# Patient Record
Sex: FEMALE | Race: WHITE | Employment: OTHER | URBAN - METROPOLITAN AREA
[De-identification: names, ages, dates, MRNs, and addresses within clinical notes are randomized per-mention and may not be internally consistent; named-entity substitution may affect disease eponyms.]

---

## 2017-02-06 ENCOUNTER — OFFICE VISIT (OUTPATIENT)
Dept: INTERNAL MEDICINE CLINIC | Age: 82
End: 2017-02-06

## 2017-02-06 VITALS
DIASTOLIC BLOOD PRESSURE: 60 MMHG | HEIGHT: 63 IN | HEART RATE: 74 BPM | WEIGHT: 176 LBS | SYSTOLIC BLOOD PRESSURE: 142 MMHG | BODY MASS INDEX: 31.18 KG/M2 | RESPIRATION RATE: 16 BRPM

## 2017-02-06 DIAGNOSIS — I15.2 OBESITY, DIABETES, AND HYPERTENSION SYNDROME (HCC): Primary | ICD-10-CM

## 2017-02-06 DIAGNOSIS — N95.1 POST MENOPAUSAL SYNDROME: ICD-10-CM

## 2017-02-06 DIAGNOSIS — E66.9 OBESITY, DIABETES, AND HYPERTENSION SYNDROME (HCC): Primary | ICD-10-CM

## 2017-02-06 DIAGNOSIS — S62.102D WRIST FRACTURE, CLOSED, LEFT, WITH ROUTINE HEALING, SUBSEQUENT ENCOUNTER: ICD-10-CM

## 2017-02-06 DIAGNOSIS — E11.69 OBESITY, DIABETES, AND HYPERTENSION SYNDROME (HCC): Primary | ICD-10-CM

## 2017-02-06 DIAGNOSIS — E11.59 OBESITY, DIABETES, AND HYPERTENSION SYNDROME (HCC): Primary | ICD-10-CM

## 2017-02-06 DIAGNOSIS — I10 ESSENTIAL HYPERTENSION: ICD-10-CM

## 2017-02-06 PROBLEM — Z71.89 ADVANCED DIRECTIVES, COUNSELING/DISCUSSION: Status: ACTIVE | Noted: 2017-02-06

## 2017-02-06 RX ORDER — METFORMIN HYDROCHLORIDE 1000 MG/1
1000 TABLET ORAL 2 TIMES DAILY WITH MEALS
Qty: 180 TAB | Refills: 1 | Status: SHIPPED | OUTPATIENT
Start: 2017-02-06 | End: 2017-07-31 | Stop reason: SDUPTHER

## 2017-02-06 NOTE — MR AVS SNAPSHOT
Visit Information Date & Time Provider Department Dept. Phone Encounter #  
 2/6/2017  9:45 AM Yassine Prescott MD [facility] [phone] [id number] Upcoming Health Maintenance Date Due DTaP/Tdap/Td series (1 - Tdap) 8/3/1956 EYE EXAM RETINAL OR DILATED Q1 4/7/2015 MEDICARE YEARLY EXAM 6/17/2017 HEMOGLOBIN A1C Q6M 6/27/2017 MICROALBUMIN Q1 12/27/2017 LIPID PANEL Q1 12/27/2017 FOOT EXAM Q1 2/6/2018 GLAUCOMA SCREENING Q2Y 10/1/2018 Allergies as of 2/6/2017  Review Complete On: 12/15/2016 By: Josi Bright PA-C No Known Allergies Current Immunizations  Reviewed on 10/18/2016 Name Date Influenza High Dose Vaccine PF 10/18/2016 Influenza Vaccine 10/1/2014, 10/1/2013 Influenza Vaccine Split 9/28/2011 Influenza Vaccine Whole 9/23/2009 Pneumococcal Conjugate (PCV-13) 10/18/2016 Pneumococcal Vaccine (Unspecified Type) 3/23/2011, 1/2/2003 Zoster 9/23/2009 Not reviewed this visit Vitals BP Pulse Resp Height(growth percentile) Weight(growth percentile) BMI  
 142/60 74 16 5' 3\" (1.6 m) 176 lb (79.8 kg) 31.18 kg/m2 OB Status Smoking Status Postmenopausal Former Smoker Vitals History BMI and BSA Data Body Mass Index Body Surface Area  
 31.18 kg/m 2 1.88 m 2 Preferred Pharmacy Pharmacy Name Phone [facility] [address] [place] [address] [phone] Your Updated Medication List  
  
   
This list is accurate as of: 2/6/17 10:18 AM.  Always use your most recent med list.  
  
  
  
  
 aspirin 81 mg chewable tablet Take 81 mg by mouth daily. azelastine 137 mcg (0.1 %) nasal spray Commonly known as:  ASTELIN  
1 Spray by Both Nostrils route two (2) times a day. Use in each nostril as directed CALCIUM PO Take  by mouth daily. chlorthalidone 25 mg tablet Commonly known as:  Bren Alford  
 Take 1 Tab by mouth daily. esomeprazole 20 mg capsule Commonly known as:  NexIUM Take 1 Cap by mouth daily. gabapentin 100 mg capsule Commonly known as:  NEURONTIN Take 2 Caps by mouth two (2) times a day. glucose blood VI test strips strip Commonly known as:  blood glucose test  
Check once daily **DX:E08.9**  
  
 ibuprofen 600 mg tablet Commonly known as:  MOTRIN Lancets Misc Use once daily **DX:E08.9**  
  
 * losartan 100 mg tablet Commonly known as:  COZAAR  
TAKE ONE TABLET BY MOUTH ONCE DAILY *NEW DOSE* * losartan 100 mg tablet Commonly known as:  COZAAR  
TAKE ONE TABLET BY MOUTH EVERY DAY  
  
 metFORMIN 1,000 mg tablet Commonly known as:  GLUCOPHAGE Take 1 Tab by mouth two (2) times daily (with meals). MULTIVITAMIN PO Take  by mouth daily. simvastatin 20 mg tablet Commonly known as:  ZOCOR  
TAKE ONE TABLET BY MOUTH AT NIGHT  
  
 triamcinolone acetonide 0.1 % topical cream  
Commonly known as:  KENALOG Apply  to affected area two (2) times a day. use thin layer * Notice: This list has 2 medication(s) that are the same as other medications prescribed for you. Read the directions carefully, and ask your doctor or other care provider to review them with you. Prescriptions Sent to Pharmacy Refills  
 metFORMIN (GLUCOPHAGE) 1,000 mg tablet 1 Sig: Take 1 Tab by mouth two (2) times daily (with meals). Class: Normal  
 Pharmacy: [facility], [address] [facility] Ph #: [phone] Route: Oral  
  
Introducing [facility]! Dear Tereza Dhaliwal: 
Thank you for requesting a [employer] account. Our records indicate that you already have an active [employer] account. You can access your account anytime at [email or web address] Did you know that you can access your hospital and ER discharge instructions at any time in [employer]?   You can also review all of your test results from your hospital stay or ER visit. Additional Information If you have questions, please visit the Frequently Asked Questions section of the [employer] website at [email or web address] Remember, [employer] is NOT to be used for urgent needs. For medical emergencies, dial 911. Now available from your iPhone and Android! Please provide this summary of care documentation to your next provider. Your primary care clinician is listed as Mario Fisher. If you have any questions after today's visit, please call [phone].

## 2017-02-06 NOTE — PROGRESS NOTES
Chief Complaint   Patient presents with    Follow-up     discuss labs      Diabetic ROS - medication compliance: compliant most of the time, diabetic diet compliance: compliant all of the time, home glucose monitoring: is performed regularly, values are usually normal, further diabetic ROS: no polyuria or polydipsia, no chest pain, dyspnea or TIA's, no numbness, tingling or pain in extremities, no hypoglycemia, no medication side effects noted, acute symptoms are joint issues  Wrist neck knee  Had left wrist fracture    . New concerns: .home glucose higher  Than before    Diabetic exam: heart sounds normal rate, regular rhythm, normal S1, S2, no murmurs, rubs, clicks or gallops, chest clear. Lab review: orders written for new lab studies as appropriate; see orders. Assessment: Diabetes Mellitus: stable. Plan: See orders for this visit as documented in the electronic medical record. Diabetic issues reviewed with her: diabetic diet discussed in detail, written exchange diet given, low cholesterol diet, weight control and daily exercise discussed and home glucose monitoring emphasized. Santhosh Aden was seen today for follow-up. Diagnoses and all orders for this visit:    Obesity, diabetes, and hypertension syndrome ([address]    Essential hypertension    Wrist fracture, closed, left, with routine healing, subsequent encounter  -     DEXA BONE DENSITY STUDY AXIAL; Future    Post menopausal syndrome  -     DEXA BONE DENSITY STUDY AXIAL; Future    Other orders  -     metFORMIN (GLUCOPHAGE) 1,000 mg tablet; Take 1 Tab by mouth two (2) times daily (with meals).       Increase metformin

## 2017-02-21 ENCOUNTER — HOSPITAL ENCOUNTER (OUTPATIENT)
Dept: MAMMOGRAPHY | Age: 82
Discharge: HOME | End: 2017-02-21
Attending: INTERNAL MEDICINE
Payer: MEDICARE

## 2017-02-21 DIAGNOSIS — N95.1 POST MENOPAUSAL SYNDROME: ICD-10-CM

## 2017-02-21 DIAGNOSIS — S62.102D WRIST FRACTURE, CLOSED, LEFT, WITH ROUTINE HEALING, SUBSEQUENT ENCOUNTER: ICD-10-CM

## 2017-02-21 PROCEDURE — 77080 DXA BONE DENSITY AXIAL: CPT

## 2017-03-09 RX ORDER — LOSARTAN POTASSIUM 100 MG/1
TABLET ORAL
Qty: 90 TAB | Refills: 1 | Status: SHIPPED | OUTPATIENT
Start: 2017-03-09 | End: 2017-09-11 | Stop reason: SDUPTHER

## 2017-05-17 DIAGNOSIS — E66.9 OBESITY, DIABETES, AND HYPERTENSION SYNDROME (HCC): Primary | ICD-10-CM

## 2017-05-17 DIAGNOSIS — I10 ESSENTIAL HYPERTENSION: ICD-10-CM

## 2017-05-17 DIAGNOSIS — E11.59 OBESITY, DIABETES, AND HYPERTENSION SYNDROME (HCC): Primary | ICD-10-CM

## 2017-05-17 DIAGNOSIS — E11.69 OBESITY, DIABETES, AND HYPERTENSION SYNDROME (HCC): Primary | ICD-10-CM

## 2017-05-17 DIAGNOSIS — I15.2 OBESITY, DIABETES, AND HYPERTENSION SYNDROME (HCC): Primary | ICD-10-CM

## 2017-05-30 RX ORDER — CHLORTHALIDONE 25 MG/1
25 TABLET ORAL DAILY
Qty: 90 TAB | Refills: 1 | Status: SHIPPED | OUTPATIENT
Start: 2017-05-30 | End: 2017-12-12 | Stop reason: SDUPTHER

## 2017-05-30 NOTE — TELEPHONE ENCOUNTER
From: Drake Gong  To: Abdifatah Christopher MD  Sent: 5/27/2017 9:05 AM EDT  Subject: Medication Renewal Request    Original authorizing provider: MD Drake Vargas would like a refill of the following medications:  chlorthalidone (HYGROTEN) 25 mg tablet Abdifatah Christopher MD]    Preferred pharmacy: [place] [address]:  What is DTaP/Tdap/Td?

## 2017-06-02 RX ORDER — CHLORTHALIDONE 25 MG/1
25 TABLET ORAL DAILY
Qty: 90 TAB | Refills: 1 | Status: SHIPPED | OUTPATIENT
Start: 2017-06-02 | End: 2017-11-22 | Stop reason: SDUPTHER

## 2017-06-06 ENCOUNTER — HOSPITAL ENCOUNTER (OUTPATIENT)
Dept: LAB | Age: 82
Discharge: HOME | End: 2017-06-06
Payer: MEDICARE

## 2017-06-06 PROCEDURE — 80061 LIPID PANEL: CPT

## 2017-06-06 PROCEDURE — 36415 COLL VENOUS BLD VENIPUNCTURE: CPT

## 2017-06-06 PROCEDURE — 82043 UR ALBUMIN QUANTITATIVE: CPT

## 2017-06-06 PROCEDURE — 80053 COMPREHEN METABOLIC PANEL: CPT

## 2017-06-06 PROCEDURE — 83036 HEMOGLOBIN GLYCOSYLATED A1C: CPT

## 2017-06-13 ENCOUNTER — OFFICE VISIT (OUTPATIENT)
Dept: INTERNAL MEDICINE CLINIC | Age: 82
End: 2017-06-13

## 2017-06-13 VITALS
OXYGEN SATURATION: 98 % | HEART RATE: 86 BPM | BODY MASS INDEX: 30.65 KG/M2 | TEMPERATURE: 99.1 F | RESPIRATION RATE: 14 BRPM | SYSTOLIC BLOOD PRESSURE: 135 MMHG | DIASTOLIC BLOOD PRESSURE: 56 MMHG | HEIGHT: 63 IN | WEIGHT: 173 LBS

## 2017-06-13 DIAGNOSIS — E11.69 OBESITY, DIABETES, AND HYPERTENSION SYNDROME (HCC): ICD-10-CM

## 2017-06-13 DIAGNOSIS — E66.9 OBESITY, DIABETES, AND HYPERTENSION SYNDROME (HCC): ICD-10-CM

## 2017-06-13 DIAGNOSIS — E11.59 OBESITY, DIABETES, AND HYPERTENSION SYNDROME (HCC): ICD-10-CM

## 2017-06-13 DIAGNOSIS — I15.2 OBESITY, DIABETES, AND HYPERTENSION SYNDROME (HCC): ICD-10-CM

## 2017-06-13 DIAGNOSIS — I10 ESSENTIAL HYPERTENSION: ICD-10-CM

## 2017-06-13 DIAGNOSIS — M70.62 TROCHANTERIC BURSITIS OF LEFT HIP: Primary | ICD-10-CM

## 2017-06-13 NOTE — PROGRESS NOTES
Chief Complaint   Patient presents with    Hip Pain    Ankle swelling    Gas     Patient Active Problem List    Diagnosis    Advanced directives, counseling/discussion    Acute medial meniscus tear of right knee    Post-operative state    Status post cervical spinal fusion    Obesity, diabetes, and hypertension syndrome (HCC)    Osteopenia    Diabetes ([facility] [address]    HLD (hyperlipidemia)    HTN (hypertension)    PSVT (paroxysmal supraventricular tachycardia) ([facility] [address]     Original episode 2007  Repeat episode 2016       Chief Complaint   Patient presents with    Hip Pain    Ankle swelling    Gas     Diabetic ROS - medication compliance: compliant most of the time, diabetic diet compliance: compliant all of the time, home glucose monitoring: is performed regularly, values are usually normal, further diabetic ROS: no polyuria or polydipsia, no chest pain, dyspnea or TIA's, no numbness, tingling or pain in extremities, no hypoglycemia, no medication side effects noted, acute symptoms are joint issues  Wrist neck knee  Had left wrist fracture  Glucose 120 to 100  . New concerns  Left buttock left hip pain  Diabetic exam: heart sounds normal rate, regular rhythm, normal S1, S2, no murmurs, rubs, clicks or gallops, chest clear. A left hip exam was performed. GENERAL: no acute distress  SWELLING: none  WARMTH: no warmth  TENDERNESS: mild and maximal at greater trochanter  ROM: full  STRENGTH: normal and equal bilaterally  GAIT: antalgic    Lab review: orders written for new lab studies as appropriate; see orders. Assessment: Diabetes Mellitus: stable. Plan: See orders for this visit as documented in the electronic medical record. Diabetic issues reviewed with her: diabetic diet discussed in detail, written exchange diet given, low cholesterol diet, weight control and daily exercise discussed and home glucose monitoring emphasized. There are no diagnoses linked to this encounter.       Lily was seen today for hip pain, ankle swelling and gas. Diagnoses and all orders for this visit:    Trochanteric bursitis of left hip    Essential hypertension    Obesity, diabetes, and hypertension syndrome ([address]    Other orders  -     diph,Pertuss,Acell,,Tet Vac-PF (ADACEL) 2 Lf-(2.5-5-3-5 mcg)-5Lf/0.5 mL susp; 0.5 mL by IntraMUSCular route once for 1 dose.       Try exercises

## 2017-06-13 NOTE — MR AVS SNAPSHOT
Visit Information Date & Time Provider Department Dept. Phone Encounter #  
 6/13/2017  1:15 PM Anabel Litten, [address] [facility] [phone] [id number] Your Appointments 7/7/2017 11:15 AM  
Medicare Physical with Anabel Litten, MD  
[facility] Appt Note: medicare wellness [place] [address] [facility] [address]  
[facility] [address] [facility] [address] [address] Upcoming Health Maintenance Date Due DTaP/Tdap/Td series (1 - Tdap) 8[birth year]3/1956 MEDICARE YEARLY EXAM 6/17/2017 INFLUENZA AGE 9 TO ADULT 8/1/2017 HEMOGLOBIN A1C Q6M 12/6/2017 FOOT EXAM Q1 2/6/2018 EYE EXAM RETINAL OR DILATED Q1 4/18/2018 MICROALBUMIN Q1 6/6/2018 LIPID PANEL Q1 6/6/2018 GLAUCOMA SCREENING Q2Y 4/18/2019 Allergies as of 6/13/2017  Review Complete On: 6/13/2017 By: Della Koehler LPN No Known Allergies Current Immunizations  Reviewed on 10/18/2016 Name Date Influenza High Dose Vaccine PF 10/18/2016 Influenza Vaccine 10/1/2014, 10/1/2013 Influenza Vaccine Split 9/28/2011 Influenza Vaccine Whole 9/23/2009 Pneumococcal Conjugate (PCV-13) 10/18/2016 Pneumococcal Vaccine (Unspecified Type) 3/23/2011, 1/2/2003 Zoster 9/23/2009 Not reviewed this visit You Were Diagnosed With   
  
 Codes Comments Essential hypertension    -  Primary ICD-10-CM: I10 
ICD-9-CM: 401.9 Vitals BP Pulse Temp Resp Height(growth percentile) Weight(growth percentile) 135/56 (BP 1 Location: Left arm, BP Patient Position: Sitting) 86 99.1 °F (37.3 °C) (Oral) 14 5' 3\" (1.6 m) 173 lb (78.5 kg) SpO2 BMI OB Status Smoking Status 98% 30.65 kg/m2 Postmenopausal Former Smoker BMI and BSA Data Body Mass Index Body Surface Area  
 30.65 kg/m 2 1.87 m 2 Preferred Pharmacy Pharmacy Name Phone [facility] [address] [place] [address] [phone] Your Updated Medication List  
  
   
This list is accurate as of: 6/13/17  1:50 PM.  Always use your most recent med list.  
  
  
  
  
 aspirin 81 mg chewable tablet Take 81 mg by mouth daily. azelastine 137 mcg (0.1 %) nasal spray Commonly known as:  ASTELIN  
1 Spray by Both Nostrils route two (2) times a day. Use in each nostril as directed CALCIUM PO Take  by mouth daily. * chlorthalidone 25 mg tablet Commonly known as:  Van Shackelford Take 1 Tab by mouth daily. * chlorthalidone 25 mg tablet Commonly known as:  Van Shackelford Take 1 Tab by mouth daily. diph,Pertuss(Acell),Tet Vac-PF 2 Lf-(2.5-5-3-5 mcg)-5Lf/0.5 mL susp Commonly known as:  ADACEL  
0.5 mL by IntraMUSCular route once for 1 dose. esomeprazole 20 mg capsule Commonly known as:  NexIUM Take 1 Cap by mouth daily. gabapentin 100 mg capsule Commonly known as:  NEURONTIN Take 2 Caps by mouth two (2) times a day. glucose blood VI test strips strip Commonly known as:  blood glucose test  
Check once daily **DX:E08.9**  
  
 ibuprofen 600 mg tablet Commonly known as:  MOTRIN Lancets Misc Use once daily **DX:E08.9**  
  
 * losartan 100 mg tablet Commonly known as:  COZAAR  
TAKE ONE TABLET BY MOUTH ONCE DAILY *NEW DOSE* * losartan 100 mg tablet Commonly known as:  COZAAR  
TAKE ONE TABLET BY MOUTH EVERY DAY  
  
 metFORMIN 1,000 mg tablet Commonly known as:  GLUCOPHAGE Take 1 Tab by mouth two (2) times daily (with meals). MULTIVITAMIN PO Take  by mouth daily. simvastatin 20 mg tablet Commonly known as:  ZOCOR  
TAKE ONE TABLET BY MOUTH AT NIGHT  
  
 triamcinolone acetonide 0.1 % topical cream  
Commonly known as:  KENALOG Apply  to affected area two (2) times a day. use thin layer * Notice:   This list has 4 medication(s) that are the same as other medications prescribed for you. Read the directions carefully, and ask your doctor or other care provider to review them with you. Prescriptions Printed Refills diph,Pertuss,Acell,,Tet Vac-PF (ADACEL) 2 Lf-(2.5-5-3-5 mcg)-5Lf/0.5 mL susp 0 Si.5 mL by IntraMUSCular route once for 1 dose. Class: Print Route: IntraMUSCular Introducing [facility] HEALTH SERVICES! Dear Hardik Benitez: 
Thank you for requesting a 2-Observe account. Our records indicate that you already have an active 2-Observe account. You can access your account anytime at [email or web address] Did you know that you can access your hospital and ER discharge instructions at any time in 2-Observe? You can also review all of your test results from your hospital stay or ER visit. Additional Information If you have questions, please visit the Frequently Asked Questions section of the 2-Observe website at [email or web address] Remember, 2-Observe is NOT to be used for urgent needs. For medical emergencies, dial 911. Now available from your iPhone and Android! Please provide this summary of care documentation to your next provider. Your primary care clinician is listed as Gaby Zepeda. If you have any questions after today's visit, please call [phone].

## 2017-06-26 NOTE — TELEPHONE ENCOUNTER
From: Errol García  To: Ealr Hernández MD  Sent: 6/26/2017 8:37 AM EDT  Subject: Medication Renewal Request    Original authorizing provider: Earl Hernández MD    Denver Hoh.  Melissa Jacek would like a refill of the following medications:  simvastatin (ZOCOR) 20 mg tablet Earl Hernández MD]    Preferred pharmacy: [address] [place]:

## 2017-06-27 RX ORDER — SIMVASTATIN 20 MG/1
TABLET, FILM COATED ORAL
Qty: 90 TAB | Refills: 3 | Status: SHIPPED | OUTPATIENT
Start: 2017-06-27 | End: 2017-12-20 | Stop reason: SDUPTHER

## 2017-07-07 ENCOUNTER — OFFICE VISIT (OUTPATIENT)
Dept: INTERNAL MEDICINE CLINIC | Age: 82
End: 2017-07-07

## 2017-07-07 VITALS
BODY MASS INDEX: 30.3 KG/M2 | OXYGEN SATURATION: 98 % | HEART RATE: 78 BPM | WEIGHT: 171 LBS | RESPIRATION RATE: 16 BRPM | DIASTOLIC BLOOD PRESSURE: 54 MMHG | SYSTOLIC BLOOD PRESSURE: 149 MMHG | HEIGHT: 63 IN

## 2017-07-07 DIAGNOSIS — Z13.39 SCREENING FOR ALCOHOLISM: ICD-10-CM

## 2017-07-07 DIAGNOSIS — Z00.00 ROUTINE GENERAL MEDICAL EXAMINATION AT A HEALTH CARE FACILITY: ICD-10-CM

## 2017-07-07 NOTE — PROGRESS NOTES
Chief Complaint   Patient presents with    Annual Wellness Visit     Left buttock pain    This is a Subsequent Medicare Annual Wellness Visit providing Personalized Prevention Plan Services (PPPS) (Performed 12 months after initial AWV and PPPS )    I have reviewed the patient's medical history in detail and updated the computerized patient record. History     Past Medical History:   Diagnosis Date    Arthritis     Diabetes ([facility] [address] 3/20/2010    HLD (hyperlipidemia) 3/20/2010    Hypertension     Osteopenia 10/30/2012    PSVT (paroxysmal supraventricular tachycardia) ([facility] [address] 3/20/2010    Psychiatric disorder     HX OF DEPRESSION-\"LONG TIME AGO\"    Psychiatric disorder     ANXIETY      Past Surgical History:   Procedure Laterality Date    BREAST SURGERY PROCEDURE UNLISTED  1975    cyst-RIGHT    CARDIAC SURG PROCEDURE UNLIST  2006    tachycardia    stress test    ENDOSCOPY, COLON, DIAGNOSTIC  2004    HX ADENOIDECTOMY      HX CATARACT REMOVAL  2013    HX CHOLECYSTECTOMY      HX DILATION AND CURETTAGE  1962    HX ORTHOPAEDIC  1976    L FOOT    HX TONSILLECTOMY      NEUROLOGICAL PROCEDURE UNLISTED  8/14    cervical decompression     Current Outpatient Prescriptions   Medication Sig Dispense Refill    simvastatin (ZOCOR) 20 mg tablet TAKE ONE TABLET BY MOUTH AT NIGHT 90 Tab 3    chlorthalidone (HYGROTEN) 25 mg tablet Take 1 Tab by mouth daily. 90 Tab 1    chlorthalidone (HYGROTEN) 25 mg tablet Take 1 Tab by mouth daily. 90 Tab 1    losartan (COZAAR) 100 mg tablet TAKE ONE TABLET BY MOUTH EVERY DAY 90 Tab 1    metFORMIN (GLUCOPHAGE) 1,000 mg tablet Take 1 Tab by mouth two (2) times daily (with meals).  180 Tab 1    Lancets misc Use once daily **DX:E08.9** 100 Each 5    losartan (COZAAR) 100 mg tablet TAKE ONE TABLET BY MOUTH ONCE DAILY *NEW DOSE* 90 Tab 0    glucose blood VI test strips (BLOOD GLUCOSE TEST) strip Check once daily **DX:E08.9** 100 Strip 2    gabapentin (NEURONTIN) 100 mg capsule Take 2 Caps by mouth two (2) times a day. 90 Cap 5    triamcinolone acetonide (KENALOG) 0.1 % topical cream Apply  to affected area two (2) times a day. use thin layer 30 g 0    ibuprofen (MOTRIN) 600 mg tablet       CALCIUM PO Take  by mouth daily.  MULTIVITAMIN PO Take  by mouth daily.  aspirin 81 mg chewable tablet Take 81 mg by mouth daily. No Known Allergies  Family History   Problem Relation Age of Onset    Heart Disease Father     Liver Disease Father      HEP C    Asthma Mother     Lung Disease Mother     Seizures Sister     Anesth Problems Neg Hx      Social History   Substance Use Topics    Smoking status: Former Smoker     Packs/day: 0.50     Years: 29.00     Quit date: 8/23/1989    Smokeless tobacco: Never Used    Alcohol use Yes      Comment: rarely     Patient Active Problem List   Diagnosis Code    Diabetes ([facility] [address] E11.9    HLD (hyperlipidemia) E78.5    HTN (hypertension) I10    PSVT (paroxysmal supraventricular tachycardia) (HCC) I47.1    Osteopenia M85.80    Obesity, diabetes, and hypertension syndrome (HCC) E11.9, I10, E66.9    Status post cervical spinal fusion Z98.1    Post-operative state Z98.890    Acute medial meniscus tear of right knee S83.241A    Advanced directives, counseling/discussion Z71.89       Depression Risk Factor Screening:     PHQ over the last two weeks 7/7/2017   Little interest or pleasure in doing things Not at all   Feeling down, depressed or hopeless Not at all   Total Score PHQ 2 0     Alcohol Risk Factor Screening: On any occasion during the past 3 months, have you had more than 4 drinks containing alcohol? No    Do you average more than 14 drinks per week? No        Functional Ability and Level of Safety:     Hearing Loss   mild    Activities of Daily Living   Self-care. Requires assistance with: no ADLs    Fall Risk   Fall Risk Assessment, last 12 mths 7/7/2017   Able to walk? Yes   Fall in past 12 months?  No   Fall with injury? -   Number of falls in past 12 months -     Abuse Screen   Patient is not abused    Review of Systems   Pertinent items are noted in HPI. Physical Examination     Evaluation of Cognitive Function:  Mood/affect:  neutral  Appearance: age appropriate  Family member/caregiver input: no    Visit Vitals    /54    Pulse 78    Resp 16    Ht 5' 3\" (1.6 m)    Wt 171 lb (77.6 kg)    SpO2 98%    BMI 30.29 kg/m2     General appearance: alert, cooperative, no distress, appears stated age  Back exam: tenderness noted slight lumbar. Patient Care Team:  Sameera Yoon MD as PCP - General  Wyatt Gonzalez MD (Ophthalmology)    Advice/Referrals/Counseling   Education and counseling provided:  Are appropriate based on today's review and evaluation  End-of-Life planning (with patient's consent)      Assessment/Plan   Lily was seen today for annual wellness visit. Diagnoses and all orders for this visit:    Routine general medical examination at a health care facility    Screening for alcoholism    . Advance Care Planning (ACP) Provider Conversation Snapshot    Date of ACP Conversation: 07/07/17  Persons included in Conversation:  patient  Length of ACP Conversation in minutes:  <16 minutes (Non-Billable)    Authorized Decision Maker (if patient is incapable of making informed decisions): This person is:    Other Legally Authorized Decision Maker (e.g. Next of Kin)          For Patients with Decision Making Capacity:   Values/Goals: Exploration of values, goals, and preferences if recovery is not expected, even with continued medical treatment in the event of:  Imminent death    Conversation Outcomes / Follow-Up Plan:   Recommended completion of Advance Directive form after review of ACP materials and conversation with prospective healthcare agent

## 2017-07-07 NOTE — MR AVS SNAPSHOT
Visit Information Date & Time Provider Department Dept. Phone Encounter #  
 7/7/2017 11:15 AM Karyle Lory, MD Formerly [facility], [facility] [phone] [id number] Upcoming Health Maintenance Date Due  
 MEDICARE YEARLY EXAM 6/17/2017 INFLUENZA AGE 9 TO ADULT 8/1/2017 HEMOGLOBIN A1C Q6M 12/6/2017 FOOT EXAM Q1 2/6/2018 EYE EXAM RETINAL OR DILATED Q1 4/18/2018 MICROALBUMIN Q1 6/6/2018 LIPID PANEL Q1 6/6/2018 GLAUCOMA SCREENING Q2Y 4/18/2019 DTaP/Tdap/Td series (2 - Td) 6/5/2027 Allergies as of 7/7/2017  Review Complete On: 7/7/2017 By: Walker Copeland LPN No Known Allergies Current Immunizations  Reviewed on 10/18/2016 Name Date Influenza High Dose Vaccine PF 10/18/2016 Influenza Vaccine 10/1/2014, 10/1/2013 Influenza Vaccine Split 9/28/2011 Influenza Vaccine Whole 9/23/2009 Pneumococcal Conjugate (PCV-13) 10/18/2016 Pneumococcal Vaccine (Unspecified Type) 3/23/2011, 1/2/2003 Zoster 9/23/2009 Not reviewed this visit You Were Diagnosed With   
  
 Codes Comments Routine general medical examination at a health care facility     ICD-10-CM: Z00.00 ICD-9-CM: V70.0 Screening for alcoholism     ICD-10-CM: Z13.89 ICD-9-CM: V79.1 Vitals BP Pulse Resp Height(growth percentile) Weight(growth percentile) SpO2  
 149/54 78 16 5' 3\" (1.6 m) 171 lb (77.6 kg) 98% BMI OB Status Smoking Status 30.29 kg/m2 Postmenopausal Former Smoker Vitals History BMI and BSA Data Body Mass Index Body Surface Area  
 30.29 kg/m 2 1.86 m 2 Preferred Pharmacy Pharmacy Name Phone [facility] [address] [place] [address] [phone] Your Updated Medication List  
  
   
This list is accurate as of: 7/7/17 11:23 AM.  Always use your most recent med list.  
  
  
  
  
 aspirin 81 mg chewable tablet Take 81 mg by mouth daily.   
  
 CALCIUM PO  
 Take  by mouth daily. * chlorthalidone 25 mg tablet Commonly known as:  Jenene Legacy Take 1 Tab by mouth daily. * chlorthalidone 25 mg tablet Commonly known as:  Jenene Legacy Take 1 Tab by mouth daily. gabapentin 100 mg capsule Commonly known as:  NEURONTIN Take 2 Caps by mouth two (2) times a day. glucose blood VI test strips strip Commonly known as:  blood glucose test  
Check once daily **DX:E08.9**  
  
 ibuprofen 600 mg tablet Commonly known as:  MOTRIN Lancets Misc Use once daily **DX:E08.9**  
  
 * losartan 100 mg tablet Commonly known as:  COZAAR  
TAKE ONE TABLET BY MOUTH ONCE DAILY *NEW DOSE* * losartan 100 mg tablet Commonly known as:  COZAAR  
TAKE ONE TABLET BY MOUTH EVERY DAY  
  
 metFORMIN 1,000 mg tablet Commonly known as:  GLUCOPHAGE Take 1 Tab by mouth two (2) times daily (with meals). MULTIVITAMIN PO Take  by mouth daily. simvastatin 20 mg tablet Commonly known as:  ZOCOR  
TAKE ONE TABLET BY MOUTH AT NIGHT  
  
 triamcinolone acetonide 0.1 % topical cream  
Commonly known as:  KENALOG Apply  to affected area two (2) times a day. use thin layer * Notice: This list has 4 medication(s) that are the same as other medications prescribed for you. Read the directions carefully, and ask your doctor or other care provider to review them with you. Introducing [facility] & HEALTH SERVICES! Dear Luzmaria Landeros: 
Thank you for requesting a [employer] account. Our records indicate that you already have an active [employer] account. You can access your account anytime at [email or web address] Did you know that you can access your hospital and ER discharge instructions at any time in [employer]? You can also review all of your test results from your hospital stay or ER visit. Additional Information If you have questions, please visit the Frequently Asked Questions section of the [employer] website at [email or web address] Remember, [employer] is NOT to be used for urgent needs. For medical emergencies, dial 911. Now available from your iPhone and Android! Please provide this summary of care documentation to your next provider. Your primary care clinician is listed as Nelsy Rouse. If you have any questions after today's visit, please call [phone].

## 2017-07-07 NOTE — PROGRESS NOTES
Coordination of Care Questions    1. Have you been to the ER, urgent care clinic since your last visit? No       Hospitalized since your last visit? No    2. Have you seen or consulted any other health care providers outside of the [address] [place] [address] since your last visit? Include any pap smears or colon screening.  No

## 2017-07-31 RX ORDER — METFORMIN HYDROCHLORIDE 1000 MG/1
1000 TABLET ORAL 2 TIMES DAILY WITH MEALS
Qty: 180 TAB | Refills: 1 | Status: SHIPPED | OUTPATIENT
Start: 2017-07-31 | End: 2018-02-10 | Stop reason: SDUPTHER

## 2017-07-31 NOTE — TELEPHONE ENCOUNTER
From: Rodolfo Gao  To: Verner Landry, MD  Sent: 7/31/2017 8:53 AM EDT  Subject: Medication Renewal Request    Original authorizing provider: Verner Landry, MD Lauree Norton.  Ángel Willis would like a refill of the following medications:  metFORMIN (GLUCOPHAGE) 1,000 mg tablet Verner Landry, MD]    Preferred pharmacy: [address] [place]:

## 2017-08-28 NOTE — TELEPHONE ENCOUNTER
From: Rodolfo Gao  To: Maine Dasilva MD  Sent: 8/27/2017 2:33 PM EDT  Subject: Medication Renewal Request    Original authorizing provider: MD Susanne Holland.  Ángel Willis would like a refill of the following medications:  glucose blood VI test strips (BLOOD GLUCOSE TEST) strip Maine Dasilva MD]    Preferred pharmacy: [place] [address], [address] [facility] 1205:

## 2017-09-05 RX ORDER — GABAPENTIN 100 MG/1
200 CAPSULE ORAL 2 TIMES DAILY
Qty: 120 CAP | Refills: 0 | Status: SHIPPED | OUTPATIENT
Start: 2017-09-05 | End: 2017-10-05 | Stop reason: SDUPTHER

## 2017-09-05 NOTE — TELEPHONE ENCOUNTER
From: Dina Mendes  To: Ruby Hay MD  Sent: 9/3/2017 9:52 AM EDT  Subject: Medication Renewal Request    Original authorizing provider: MD Pierce Siu Overall would like a refill of the following medications:  gabapentin (NEURONTIN) 100 mg capsule Ruby Hay MD]    Preferred pharmacy: [address] [place]:

## 2017-09-11 RX ORDER — LOSARTAN POTASSIUM 100 MG/1
TABLET ORAL
Qty: 90 TAB | Refills: 1 | Status: SHIPPED | OUTPATIENT
Start: 2017-09-11 | End: 2018-03-13 | Stop reason: SDUPTHER

## 2017-09-11 NOTE — TELEPHONE ENCOUNTER
From: Garnetta Babinski  To: Richard Damon MD  Sent: 9/11/2017 10:08 AM EDT  Subject: Medication Renewal Request    Original authorizing provider: MD Mecca Phamkassidy NicolasBryce Aparicio would like a refill of the following medications:  losartan (COZAAR) 100 mg tablet Richard Damon MD]    Preferred pharmacy: [address] [place]:

## 2017-10-05 RX ORDER — GABAPENTIN 100 MG/1
200 CAPSULE ORAL 2 TIMES DAILY
Qty: 120 CAP | Refills: 5 | Status: SHIPPED | OUTPATIENT
Start: 2017-10-05 | End: 2017-11-03 | Stop reason: SDUPTHER

## 2017-10-05 NOTE — TELEPHONE ENCOUNTER
From: Therese Gu  To: Carlos Shepherd MD  Sent: 10/5/2017 11:49 AM EDT  Subject: Medication Renewal Request    Original authorizing provider: MD Nora Gurrola.  Vicente Madison would like a refill of the following medications:  gabapentin (NEURONTIN) 100 mg capsule Carlos Shepherd MD]    Preferred pharmacy: [place] [address], [address] [facility] 1205:

## 2017-11-03 NOTE — TELEPHONE ENCOUNTER
From: Ursula Trujillo  To: Anu Damon MD  Sent: 11/3/2017 10:42 AM EDT  Subject: Medication Renewal Request    Original authorizing provider: MD Jeanna Ferguson.  [facility] would like a refill of the following medications:  gabapentin (NEURONTIN) 100 mg capsule Anu Damon MD]    Preferred pharmacy: [address] [place]:

## 2017-11-05 RX ORDER — GABAPENTIN 100 MG/1
200 CAPSULE ORAL 2 TIMES DAILY
Qty: 120 CAP | Refills: 5 | Status: SHIPPED | OUTPATIENT
Start: 2017-11-05 | End: 2018-04-07 | Stop reason: SDUPTHER

## 2017-11-22 RX ORDER — CHLORTHALIDONE 25 MG/1
25 TABLET ORAL DAILY
Qty: 90 TAB | Refills: 1 | Status: SHIPPED | OUTPATIENT
Start: 2017-11-22 | End: 2018-05-22 | Stop reason: SDUPTHER

## 2017-11-24 DIAGNOSIS — E66.9 OBESITY, DIABETES, AND HYPERTENSION SYNDROME (HCC): Primary | ICD-10-CM

## 2017-11-24 DIAGNOSIS — I10 ESSENTIAL HYPERTENSION: ICD-10-CM

## 2017-11-24 DIAGNOSIS — I15.2 OBESITY, DIABETES, AND HYPERTENSION SYNDROME (HCC): Primary | ICD-10-CM

## 2017-11-24 DIAGNOSIS — E11.69 OBESITY, DIABETES, AND HYPERTENSION SYNDROME (HCC): Primary | ICD-10-CM

## 2017-11-24 DIAGNOSIS — E11.59 OBESITY, DIABETES, AND HYPERTENSION SYNDROME (HCC): Primary | ICD-10-CM

## 2017-12-07 LAB
INR, EXTERNAL: 1
PT, EXTERNAL: 11.3

## 2017-12-08 LAB
CREATININE, EXTERNAL: 1.26
HBA1C MFR BLD HPLC: 6.5 %
INR, EXTERNAL: 1.1
LDL-C, EXTERNAL: 42
PT, EXTERNAL: 12.2

## 2017-12-12 ENCOUNTER — PATIENT OUTREACH (OUTPATIENT)
Dept: INTERNAL MEDICINE CLINIC | Age: 82
End: 2017-12-12

## 2017-12-12 RX ORDER — CEPHALEXIN 500 MG/1
500 CAPSULE ORAL 2 TIMES DAILY
COMMUNITY
Start: 2017-12-13 | End: 2017-12-13

## 2017-12-12 NOTE — PROGRESS NOTES
Patient was admitted to [facility] - for NSTEMI. Presented with c/o palpitations, dizziness, low BP. Hospital Course:    Palpitations/Dizziness: head CT, brain MRI, head/neck MRA unremarkable, most likely cardiac related    NSTEMI: troponin peaked at 1.1, initially placed on heparin drip echo with EF 55-60%, cardiac cath  with nonobstructive CAD, cont ASA/statin, no BB added per cardiology    UTI: urine cx positive for E coli, transition from Rocephin to PO Keflex    PLAN: Discharged home. Instructed to f/u with cardiology Dr. Terri Reyes in 2 wks, PCP in one week. No medication changes. : Contacted patient for WILLIAM follow up. Introduced self and Nurse Navigator role. Verified patient's . Reports she is doing well, \"taking it easy\", which is difficult for her because she is typically very active. Her  is home with her and encourages her to rest. Cardiac cath site R wrist, pt to remove dressing this morning, wash site, and cover with a Band-aid, per pt. Denies any pain, swelling, redness at site. Reports minimal bruising. Reviewed red flags and when to call cardiology. Patient typically checks blood sugar daily, typically ranges . She has not checked yet today. She is aware that HgbA1C is at goal at 6.5. Exercises 4-5 days per week-water aerobics 2 days/wk & walks at the mall 2-3 days/wk, stays active with house work. Denies any s/s UTI prior to admission or now. Will finish Keflex today. Reviewed all medications with patient reading from a current list and updated Med Rec. No medication changes at discharge. Reviewed plan to attend appointment with Dr. Mildred Carreon scheduled next  at 2:15 pm. Appointment scheduled with cardiology Dr. Terri Reyes 18. The patient denies any questions or concerns.

## 2017-12-13 RX ORDER — CHROMIUM PICOLINATE 200 MCG
2 TABLET ORAL DAILY
COMMUNITY
End: 2019-09-11 | Stop reason: SDUPTHER

## 2017-12-20 ENCOUNTER — OFFICE VISIT (OUTPATIENT)
Dept: INTERNAL MEDICINE CLINIC | Age: 82
End: 2017-12-20

## 2017-12-20 VITALS
HEART RATE: 76 BPM | DIASTOLIC BLOOD PRESSURE: 50 MMHG | RESPIRATION RATE: 16 BRPM | OXYGEN SATURATION: 98 % | WEIGHT: 172 LBS | HEIGHT: 63 IN | SYSTOLIC BLOOD PRESSURE: 134 MMHG | BODY MASS INDEX: 30.48 KG/M2

## 2017-12-20 DIAGNOSIS — I25.119 CORONARY ARTERY DISEASE INVOLVING NATIVE CORONARY ARTERY OF NATIVE HEART WITH ANGINA PECTORIS (HCC): ICD-10-CM

## 2017-12-20 PROBLEM — E11.21 TYPE 2 DIABETES MELLITUS WITH NEPHROPATHY (HCC): Status: ACTIVE | Noted: 2017-12-20

## 2017-12-20 RX ORDER — SIMVASTATIN 40 MG/1
40 TABLET, FILM COATED ORAL
Qty: 90 TAB | Refills: 3 | Status: SHIPPED | OUTPATIENT
Start: 2017-12-20 | End: 2018-12-12 | Stop reason: SDUPTHER

## 2017-12-20 NOTE — MR AVS SNAPSHOT
Visit Information Date & Time Provider Department Dept. Phone Encounter #  
 12/20/2017  2:15 PM Brooke Myers, [address] [facility] [phone] [id number] Follow-up Instructions Return in about 3 months (around 3/20/2018). Follow-up and Disposition History Upcoming Health Maintenance Date Due HEMOGLOBIN A1C Q6M 12/6/2017 FOOT EXAM Q1 2/6/2018 EYE EXAM RETINAL OR DILATED Q1 4/18/2018 MICROALBUMIN Q1 6/6/2018 LIPID PANEL Q1 6/6/2018 MEDICARE YEARLY EXAM 7/8/2018 GLAUCOMA SCREENING Q2Y 4/18/2019 DTaP/Tdap/Td series (2 - Td) 6/14/2027 Allergies as of 12/20/2017  Review Complete On: 7/7/2017 By: Miguelina Mckinley LPN No Known Allergies Current Immunizations  Reviewed on 10/5/2017 Name Date Influenza High Dose Vaccine PF 10/4/2017, 10/18/2016 Influenza Vaccine 10/1/2014, 10/1/2013 Influenza Vaccine Split 9/28/2011 Influenza Vaccine Whole 9/23/2009 Pneumococcal Conjugate (PCV-13) 10/18/2016 Tdap 6/14/2017 ZZZ-RETIRED (DO NOT USE) Pneumococcal Vaccine (Unspecified Type) 3/23/2011, 1/2/2003 Zoster 9/23/2009 Not reviewed this visit You Were Diagnosed With   
  
 Codes Comments Coronary artery disease involving native coronary artery of native heart with angina pectoris ([facility] [address]     ICD-10-CM: I25.119 ICD-9-CM: 414.01, 413.9 Vitals BP Pulse Resp Height(growth percentile) Weight(growth percentile) SpO2  
 134/50 76 16 5' 3\" (1.6 m) 172 lb (78 kg) 98% BMI OB Status Smoking Status 30.47 kg/m2 Postmenopausal Former Smoker Vitals History BMI and BSA Data Body Mass Index Body Surface Area  
 30.47 kg/m 2 1.86 m 2 Preferred Pharmacy Pharmacy Name Phone [place] [facility] [address] [facility] [phone] Your Updated Medication List  
  
   
This list is accurate as of: 12/20/17  2:40 PM.  Always use your most recent med list.  
  
  
  
  
 aspirin 81 mg chewable tablet Take 81 mg by mouth daily. Calcium-Cholecalciferol (D3) 600 mg(1,500mg) -400 unit Cap Take 2 Tabs by mouth daily. chlorthalidone 25 mg tablet Commonly known as:  Kisha Citizen Take 1 Tab by mouth daily. gabapentin 100 mg capsule Commonly known as:  NEURONTIN Take 2 Caps by mouth two (2) times a day. glucose blood VI test strips strip Commonly known as:  blood glucose test  
Check once daily **DX:E08.9** Lancets Misc Use once daily **DX:E08.9**  
  
 losartan 100 mg tablet Commonly known as:  COZAAR  
TAKE ONE TABLET BY MOUTH EVERY DAY  
  
 metFORMIN 1,000 mg tablet Commonly known as:  GLUCOPHAGE Take 1 Tab by mouth two (2) times daily (with meals). MULTIVITAMIN PO Take 1 Tab by mouth daily. simvastatin 40 mg tablet Commonly known as:  ZOCOR Take 1 Tab by mouth nightly. TAKE ONE TABLET BY MOUTH AT NIGHT Prescriptions Printed Refills  
 simvastatin (ZOCOR) 40 mg tablet 3 Sig: Take 1 Tab by mouth nightly. TAKE ONE TABLET BY MOUTH AT NIGHT Class: Print Route: Oral  
  
Follow-up Instructions Return in about 3 months (around 3/20/2018). To-Do List   
 12/28/2017 2:30 PM  
(Arrive by 2:15 PM) Appointment with [facility] 1 at [facility] ([phone]) Shower or bathe using soap and water. Do not use deodorant, powder, perfumes, or lotion the day of your exam.  If your prior mammograms were not performed at [facility] please bring films with you or forward prior images 2 days before your procedure. Check in at registration 15min before your appointment time unless you were instructed to do otherwise. A script is not necessary, but if you have one, please bring it on the day of the mammogram or have it faxed to the department.   [facility] [phone] [facility]  [phone] [facility] [phone] [facility]  [phone] [facility] [phone] [facility] [phone] [facility] Please arrive 15 minutes prior to appointment to register Introducing [facility] & [facility] SERVICES! Dear Sathya Wells: 
Thank you for requesting a [employer] account. Our records indicate that you already have an active [employer] account. You can access your account anytime at [email or web address] Did you know that you can access your hospital and ER discharge instructions at any time in [employer]? You can also review all of your test results from your hospital stay or ER visit. Additional Information If you have questions, please visit the Frequently Asked Questions section of the [employer] website at [email or web address] Remember, [employer] is NOT to be used for urgent needs. For medical emergencies, dial 911. Now available from your iPhone and Android! Please provide this summary of care documentation to your next provider. Your primary care clinician is listed as Pearl Aldana. If you have any questions after today's visit, please call [phone].

## 2017-12-20 NOTE — PROGRESS NOTES
Chief Complaint   Patient presents with   [facility] Follow Up     patient was seen in [facility] on 12/14      [facility] 12/7-12/12 for NSTEMI. Presented with c/o palpitations, dizziness, low BP.     Hospital Course:     Palpitations/Dizziness: head CT, brain MRI, head/neck MRA unremarkable, most likely cardiac related     NSTEMI: troponin peaked at 1.1, initially placed on heparin drip echo with EF 55-60%, cardiac cath 12/11 with nonobstructive CAD, cont ASA/statin, no BB added per cardiology     UTI: urine cx positive for E coli, transition from Rocephin to PO Keflex     PLAN: Discharged home. Instructed to f/u with cardiology Dr. Keeley Maza in 2 wks, PCP in one week. No medication changes.         Ms. Jason Mello is a [de-identified]y.o. year old female, she is seen today for Transition of Care services following a hospital discharge for nstemi on 12/12. Our office Nurse Navigator performed an outreach to Ms. Yvette Alvarado on 12/13 (within 2 business days of discharge) to complete medication reconciliation and a telephonic assessment of her condition. See card next week     1.  Coronary artery disease involving native coronary artery of native heart with angina pectoris ([address]  Will double dose statin

## 2017-12-28 ENCOUNTER — HOSPITAL ENCOUNTER (OUTPATIENT)
Dept: MAMMOGRAPHY | Age: 82
Discharge: HOME | End: 2017-12-28
Attending: INTERNAL MEDICINE
Payer: MEDICARE

## 2017-12-28 DIAGNOSIS — Z12.31 VISIT FOR SCREENING MAMMOGRAM: ICD-10-CM

## 2017-12-28 PROCEDURE — 77067 SCR MAMMO BI INCL CAD: CPT

## 2018-01-11 ENCOUNTER — PATIENT OUTREACH (OUTPATIENT)
Dept: INTERNAL MEDICINE CLINIC | Age: 83
End: 2018-01-11

## 2018-01-11 NOTE — PROGRESS NOTES
Patient has completed 30 day transition of care. Attended follow up FATOU LARIOS appointment with Dr. Marilu Hassan on 12/20/17. Goals met. No other needs identified to Nurse Navigator at this time. Resolving WILLIAM episode.

## 2018-02-12 RX ORDER — METFORMIN HYDROCHLORIDE 1000 MG/1
1000 TABLET ORAL 2 TIMES DAILY WITH MEALS
Qty: 180 TAB | Refills: 1 | Status: SHIPPED | OUTPATIENT
Start: 2018-02-12 | End: 2018-08-07 | Stop reason: SDUPTHER

## 2018-02-12 NOTE — TELEPHONE ENCOUNTER
From: Sonal Hubbard  To: Fantasma Seaman MD  Sent: 2/10/2018 8:55 AM EST  Subject: Medication Renewal Request    Original authorizing provider: MD Michelle Reeves.  Kuldeep Walter would like a refill of the following medications:  metFORMIN (GLUCOPHAGE) 1,000 mg tablet Fantasma Seaman MD]    Preferred pharmacy: [address] [place] [address] 36.:

## 2018-02-19 RX ORDER — LANCETS
EACH MISCELLANEOUS
Qty: 100 EACH | Refills: 5 | Status: SHIPPED | OUTPATIENT
Start: 2018-02-19 | End: 2019-04-20 | Stop reason: SDUPTHER

## 2018-02-19 NOTE — TELEPHONE ENCOUNTER
From: Bridgett Vance  To: Orestes Sequeira MD  Sent: 2/18/2018 9:42 AM EST  Subject: Medication Renewal Request    Original authorizing provider: MD Terence Thompson.  Maryclare Gaucher would like a refill of the following medications:  Lancets misc Orestes Sequeira MD]    Preferred pharmacy: [address] [place] [address], [facility] 36.:

## 2018-03-13 RX ORDER — LOSARTAN POTASSIUM 100 MG/1
TABLET ORAL
Qty: 90 TAB | Refills: 1 | Status: SHIPPED | OUTPATIENT
Start: 2018-03-13 | End: 2018-09-06 | Stop reason: SDUPTHER

## 2018-03-20 ENCOUNTER — OFFICE VISIT (OUTPATIENT)
Dept: INTERNAL MEDICINE CLINIC | Age: 83
End: 2018-03-20

## 2018-03-20 VITALS
DIASTOLIC BLOOD PRESSURE: 50 MMHG | OXYGEN SATURATION: 98 % | SYSTOLIC BLOOD PRESSURE: 137 MMHG | HEIGHT: 63 IN | WEIGHT: 172 LBS | BODY MASS INDEX: 30.48 KG/M2 | RESPIRATION RATE: 16 BRPM | HEART RATE: 72 BPM

## 2018-03-20 DIAGNOSIS — L25.9 CONTACT DERMATITIS, UNSPECIFIED CONTACT DERMATITIS TYPE, UNSPECIFIED TRIGGER: Primary | ICD-10-CM

## 2018-03-20 RX ORDER — TRIAMCINOLONE ACETONIDE 1 MG/G
CREAM TOPICAL 2 TIMES DAILY
Qty: 15 G | Refills: 0 | Status: SHIPPED | OUTPATIENT
Start: 2018-03-20 | End: 2019-09-11 | Stop reason: SDUPTHER

## 2018-03-20 NOTE — PROGRESS NOTES
Chief Complaint   Patient presents with    Rash     on chest and neck, very itchy , patient has been using hydrocortisone and bendryl    5 days  Some itching  Better some with hydrocort  No new meds new soaps or shampoos  No colds or uris    Vitals:    03/20/18 1406   BP: 137/50   Pulse: 72   Resp: 16   SpO2: 98%   Weight: 172 lb (78 kg)   Height: 5' 3\" (1.6 m)     Skin exam - DERMATITIS NOTED: atopic dermatitis on chest macular no other location      Diagnoses and all orders for this visit:    1. Contact dermatitis, unspecified contact dermatitis type, unspecified trigger  -     triamcinolone acetonide (KENALOG) 0.1 % topical cream; Apply  to affected area two (2) times a day. use thin layer      .

## 2018-04-09 RX ORDER — GABAPENTIN 100 MG/1
200 CAPSULE ORAL 2 TIMES DAILY
Qty: 120 CAP | Refills: 5 | Status: SHIPPED | OUTPATIENT
Start: 2018-04-09 | End: 2018-10-10 | Stop reason: SDUPTHER

## 2018-04-09 NOTE — TELEPHONE ENCOUNTER
From: Sakshi Gonzalez  To: Cuco Daniels MD  Sent: 4/7/2018 11:43 AM EDT  Subject: Medication Renewal Request    Original authorizing provider: Cuco Daniels MD    [facility].  Khloe Lassiter would like a refill of the following medications:  gabapentin (NEURONTIN) 100 mg capsule Cuco Daniels MD]    Preferred pharmacy: [address] [place] [address] 36.:

## 2018-05-18 DIAGNOSIS — E11.21 TYPE 2 DIABETES MELLITUS WITH NEPHROPATHY (HCC): Primary | ICD-10-CM

## 2018-05-18 DIAGNOSIS — I25.119 CORONARY ARTERY DISEASE INVOLVING NATIVE CORONARY ARTERY OF NATIVE HEART WITH ANGINA PECTORIS (HCC): ICD-10-CM

## 2018-06-14 ENCOUNTER — HOSPITAL ENCOUNTER (OUTPATIENT)
Dept: LAB | Age: 83
Discharge: HOME | End: 2018-06-14
Payer: MEDICARE

## 2018-06-14 PROCEDURE — 83036 HEMOGLOBIN GLYCOSYLATED A1C: CPT

## 2018-06-14 PROCEDURE — 80053 COMPREHEN METABOLIC PANEL: CPT

## 2018-06-14 PROCEDURE — 85025 COMPLETE CBC W/AUTO DIFF WBC: CPT

## 2018-06-14 PROCEDURE — 36415 COLL VENOUS BLD VENIPUNCTURE: CPT

## 2018-06-14 PROCEDURE — 80061 LIPID PANEL: CPT

## 2018-06-14 PROCEDURE — 82043 UR ALBUMIN QUANTITATIVE: CPT

## 2018-06-15 LAB
ALBUMIN SERPL-MCNC: 4.5 G/DL (ref 3.5–4.7)
ALBUMIN/CREAT UR: 28.3 MG/G CREAT (ref 0–30)
ALBUMIN/GLOB SERPL: 2 {RATIO} (ref 1.2–2.2)
ALP SERPL-CCNC: 43 IU/L (ref 39–117)
ALT SERPL-CCNC: 22 IU/L (ref 0–32)
AST SERPL-CCNC: 32 IU/L (ref 0–40)
BASOPHILS # BLD AUTO: 0 X10E3/UL (ref 0–0.2)
BASOPHILS NFR BLD AUTO: 0 %
BILIRUB SERPL-MCNC: 0.3 MG/DL (ref 0–1.2)
BUN SERPL-MCNC: 30 MG/DL (ref 8–27)
BUN/CREAT SERPL: 26 (ref 12–28)
CALCIUM SERPL-MCNC: 10.1 MG/DL (ref 8.7–10.3)
CHLORIDE SERPL-SCNC: 96 MMOL/L (ref 96–106)
CHOLEST SERPL-MCNC: 165 MG/DL (ref 100–199)
CO2 SERPL-SCNC: 25 MMOL/L (ref 20–29)
CREAT SERPL-MCNC: 1.16 MG/DL (ref 0.57–1)
CREAT UR-MCNC: 44.5 MG/DL
EOSINOPHIL # BLD AUTO: 0.3 X10E3/UL (ref 0–0.4)
EOSINOPHIL NFR BLD AUTO: 5 %
ERYTHROCYTE [DISTWIDTH] IN BLOOD BY AUTOMATED COUNT: 13.1 % (ref 12.3–15.4)
GFR SERPLBLD CREATININE-BSD FMLA CKD-EPI: 45 ML/MIN/1.73
GFR SERPLBLD CREATININE-BSD FMLA CKD-EPI: 51 ML/MIN/1.73
GLOBULIN SER CALC-MCNC: 2.2 G/DL (ref 1.5–4.5)
GLUCOSE SERPL-MCNC: 123 MG/DL (ref 65–99)
HBA1C MFR BLD: 6.1 % (ref 4.8–5.6)
HCT VFR BLD AUTO: 37.7 % (ref 34–46.6)
HDLC SERPL-MCNC: 70 MG/DL
HGB BLD-MCNC: 12.3 G/DL (ref 11.1–15.9)
IMM GRANULOCYTES # BLD: 0 X10E3/UL (ref 0–0.1)
IMM GRANULOCYTES NFR BLD: 0 %
INTERPRETATION, 910389: NORMAL
INTERPRETATION: NORMAL
LDLC SERPL CALC-MCNC: 77 MG/DL (ref 0–99)
LYMPHOCYTES # BLD AUTO: 1.3 X10E3/UL (ref 0.7–3.1)
LYMPHOCYTES NFR BLD AUTO: 27 %
Lab: NORMAL
MCH RBC QN AUTO: 31.5 PG (ref 26.6–33)
MCHC RBC AUTO-ENTMCNC: 32.6 G/DL (ref 31.5–35.7)
MCV RBC AUTO: 96 FL (ref 79–97)
MICROALBUMIN UR-MCNC: 12.6 UG/ML
MONOCYTES # BLD AUTO: 0.4 X10E3/UL (ref 0.1–0.9)
MONOCYTES NFR BLD AUTO: 8 %
NEUTROPHILS # BLD AUTO: 2.9 X10E3/UL (ref 1.4–7)
NEUTROPHILS NFR BLD AUTO: 60 %
PDF IMAGE, 910387: NORMAL
PLATELET # BLD AUTO: 221 X10E3/UL (ref 150–379)
POTASSIUM SERPL-SCNC: 5 MMOL/L (ref 3.5–5.2)
PROT SERPL-MCNC: 6.7 G/DL (ref 6–8.5)
RBC # BLD AUTO: 3.91 X10E6/UL (ref 3.77–5.28)
SODIUM SERPL-SCNC: 136 MMOL/L (ref 134–144)
TRIGL SERPL-MCNC: 90 MG/DL (ref 0–149)
VLDLC SERPL CALC-MCNC: 18 MG/DL (ref 5–40)
WBC # BLD AUTO: 4.9 X10E3/UL (ref 3.4–10.8)

## 2018-07-20 ENCOUNTER — OFFICE VISIT (OUTPATIENT)
Dept: INTERNAL MEDICINE CLINIC | Age: 83
End: 2018-07-20

## 2018-07-20 VITALS — DIASTOLIC BLOOD PRESSURE: 78 MMHG | SYSTOLIC BLOOD PRESSURE: 136 MMHG

## 2018-07-20 VITALS
DIASTOLIC BLOOD PRESSURE: 69 MMHG | SYSTOLIC BLOOD PRESSURE: 142 MMHG | OXYGEN SATURATION: 98 % | TEMPERATURE: 98.7 F | WEIGHT: 177.2 LBS | BODY MASS INDEX: 31.4 KG/M2 | HEART RATE: 76 BPM | HEIGHT: 63 IN | RESPIRATION RATE: 16 BRPM

## 2018-07-20 DIAGNOSIS — E78.00 PURE HYPERCHOLESTEROLEMIA: ICD-10-CM

## 2018-07-20 DIAGNOSIS — I25.119 CORONARY ARTERY DISEASE INVOLVING NATIVE CORONARY ARTERY OF NATIVE HEART WITH ANGINA PECTORIS (HCC): ICD-10-CM

## 2018-07-20 DIAGNOSIS — E11.21 TYPE 2 DIABETES MELLITUS WITH NEPHROPATHY (HCC): ICD-10-CM

## 2018-07-20 DIAGNOSIS — I10 ESSENTIAL HYPERTENSION: ICD-10-CM

## 2018-07-20 DIAGNOSIS — I25.119 CORONARY ARTERY DISEASE INVOLVING NATIVE CORONARY ARTERY OF NATIVE HEART WITH ANGINA PECTORIS (HCC): Primary | ICD-10-CM

## 2018-07-20 DIAGNOSIS — Z00.00 MEDICARE ANNUAL WELLNESS VISIT, SUBSEQUENT: Primary | ICD-10-CM

## 2018-07-20 DIAGNOSIS — Z00.00 ROUTINE GENERAL MEDICAL EXAMINATION AT A HEALTH CARE FACILITY: ICD-10-CM

## 2018-07-20 NOTE — MR AVS SNAPSHOT
[address] [place] [address] [address] [facility] 
[phone] Patient: Kem Hirsch MRN:  [id number] Visit Information Date & Time Provider Department Dept. Phone Encounter #  
 2018  9:45 AM Raza Harris MD [facility] [phone] [id number] Your Appointments 2018 11:00 AM  
Medicare Physical with Raza Harris MD  
[facility] [address]) Appt Note: Medicare Wellness; r/s medicare wellness , tb 18; r/s medicare wellness [place] [address] [place] [id number]  
[facility] [facility] [address] Upcoming Health Maintenance Date Due  
 MEDICARE YEARLY EXAM 2018 Influenza Age 5 to Adult 2018 HEMOGLOBIN A1C Q6M 2018 FOOT EXAM Q1 2019 EYE EXAM RETINAL OR DILATED Q1 2019 MICROALBUMIN Q1 2019 LIPID PANEL Q1 2019 GLAUCOMA SCREENING Q2Y 2020 DTaP/Tdap/Td series (2 - Td) 2027 Allergies as of 2018  Review Complete On: 2017 By: Patsy Munoz LPN No Known Allergies Current Immunizations  Reviewed on 10/5/2017 Name Date Influenza High Dose Vaccine PF 10/4/2017, 10/18/2016 Influenza Vaccine 10/1/2014, 10/1/2013 Influenza Vaccine Split 2011 Influenza Vaccine Whole 2009 Pneumococcal Conjugate (PCV-13) 10/18/2016 Tdap 2017 ZZZ-RETIRED (DO NOT USE) Pneumococcal Vaccine (Unspecified Type) 3/23/2011, 2003 Zoster 2009 Not reviewed this visit You Were Diagnosed With   
  
 Codes Comments Medicare annual wellness visit, subsequent    -  Primary ICD-10-CM: Z00.00 ICD-9-CM: V70.0 Vitals BP Pulse Temp Resp Height(growth percentile) Weight(growth percentile)  142/69 76 98.7 °F (37.1 °C) (Oral) 16 5' 3\" (1.6 m) 177 lb 3.2 oz (80.4 kg)  
 SpO2 BMI OB Status Smoking Status 98% 31.39 kg/m2 Postmenopausal Former Smoker Vitals History BMI and BSA Data Body Mass Index Body Surface Area  
 31.39 kg/m 2 1.89 m 2 Preferred Pharmacy Pharmacy Name Phone [facility], [address] [place] [phone] Your Updated Medication List  
  
   
This list is accurate as of 7/20/18 10:29 AM.  Always use your most recent med list.  
  
  
  
  
 aspirin 81 mg chewable tablet Take 81 mg by mouth daily. Calcium-Cholecalciferol (D3) 600 mg(1,500mg) -400 unit Cap Take 2 Tabs by mouth daily. chlorthalidone 25 mg tablet Commonly known as:  Ceci Cork Take 1 Tab by mouth daily. gabapentin 100 mg capsule Commonly known as:  NEURONTIN Take 2 Caps by mouth two (2) times a day. glucose blood VI test strips strip Commonly known as:  blood glucose test  
Check once daily **DX:E08.9** Lancets Misc Use once daily **DX:E08.9**  
  
 losartan 100 mg tablet Commonly known as:  COZAAR  
TAKE ONE TABLET BY MOUTH ONCE DAILY  
  
 metFORMIN 1,000 mg tablet Commonly known as:  GLUCOPHAGE Take 1 Tab by mouth two (2) times daily (with meals). MULTIVITAMIN PO Take 1 Tab by mouth daily. simvastatin 40 mg tablet Commonly known as:  ZOCOR Take 1 Tab by mouth nightly. TAKE ONE TABLET BY MOUTH AT NIGHT  
  
 triamcinolone acetonide 0.1 % topical cream  
Commonly known as:  KENALOG Apply  to affected area two (2) times a day. use thin layer Patient Instructions Medicare Wellness Visit, Female The best way to live healthy is to have a lifestyle where you eat a well-balanced diet, exercise regularly, limit alcohol use, and quit all forms of tobacco/nicotine, if applicable. Regular preventive services are another way to keep healthy.  Preventive services (vaccines, screening tests, monitoring & exams) can help personalize your care plan, which helps you manage your own care. Screening tests can find health problems at the earliest stages, when they are easiest to treat. [address] follows the current, evidence-based guidelines published by the [facility] Elvin Walsh ([facility]) when recommending preventive services for our patients. Because we follow these guidelines, sometimes recommendations change over time as research supports it. (For example, mammograms used to be recommended annually. Even though Medicare will still pay for an annual mammogram, the newer guidelines recommend a mammogram every two years for women of average risk.) Of course, you and your provider may decide to screen more often for some diseases, based on your risk and co-morbidities (chronic disease you are already diagnosed with). Preventive services for you include: - Medicare offers their members a free annual wellness visit, which is time for you and your primary care provider to discuss and plan for your preventive service needs. Take advantage of this benefit every year! 
 
-All people over age 72 should receive the recommended pneumonia vaccines. Current USPSTF guidelines recommend a series of two vaccines for the best pneumonia protection.  
 
-All adults should have a yearly flu vaccine and a tetanus vaccine every 10 years. All adults age 61 years should receive a shingles vaccine once in their lifetime.   
 
-A bone mass density test is recommended when a woman turns 65 to screen for osteoporosis. This test is only recommended once as a screening. Some providers will use this same test as a disease monitoring tool if you already have osteoporosis.  
 
-All adults age 38-68 years who are overweight should have a diabetes screening test once every three years.  
 
-Other screening tests & preventive services for persons with diabetes include: an eye exam to screen for diabetic retinopathy, a kidney function test, a foot exam, and stricter control over your cholesterol.  
 
-Cardiovascular screening for adults with routine risk involves an electrocardiogram (ECG) at intervals determined by the provider.  
 
-Colorectal cancer screenings should be done for adults age 54-65 years with normal risk. There are a number of acceptable methods of screening for this type of cancer. Each test has its own benefits and drawbacks. Discuss with your provider what is most appropriate for you during your annual wellness visit. The different tests include: colonoscopy (considered the best screening method), a fecal occult blood test, a fecal DNA test, and sigmoidoscopy. -Breast cancer screenings are recommended every other year for women of normal risk age 54-69 years.  
 
-Cervical cancer screenings for women over age 72 are only recommended with certain risk factors.  
 
-All adults born between [facility] should be screened once for Hepatitis C. Here is a list of your current Health Maintenance items (your personalized list of preventive services) with a due date: 
Health Maintenance Due Topic Date Due  
 Annual Well Visit  07/08/2018 Introducing [facility]! Dear Sunitha Long: 
Thank you for requesting a [employer] account. Our records indicate that you already have an active [employer] account. You can access your account anytime at [email or web address] Did you know that you can access your hospital and ER discharge instructions at any time in [employer]? You can also review all of your test results from your hospital stay or ER visit. Additional Information If you have questions, please visit the Frequently Asked Questions section of the [employer] website at [email or web address] Remember, [employer] is NOT to be used for urgent needs. For medical emergencies, dial 911. Now available from your iPhone and Android! Please provide this summary of care documentation to your next provider. Your primary care clinician is listed as Jonathan Capps. If you have any questions after today's visit, please call [phone].

## 2018-07-20 NOTE — PROGRESS NOTES
Health Maintenance Due   Topic Date Due    MEDICARE Areatha Primmer  07/08/2018       Chief Complaint   Patient presents with    Diabetes    Hypertension    Coronary Artery Disease    Osteopenia       1. Have you been to the ER, urgent care clinic since your last visit? Hospitalized since your last visit? No    2. Have you seen or consulted any other health care providers outside of the [facility] since your last visit? Include any pap smears or colon screening. No    3) Do you have an Advance Directive on file? yes    4) Are you interested in receiving information on Advance Directives? NO      Patient is accompanied by self I have received verbal consent from Amy Hughes to discuss any/all medical information while they are present in the room.

## 2018-07-20 NOTE — PATIENT INSTRUCTIONS
Medicare Wellness Visit, Female    The best way to live healthy is to have a lifestyle where you eat a well-balanced diet, exercise regularly, limit alcohol use, and quit all forms of tobacco/nicotine, if applicable. Regular preventive services are another way to keep healthy. Preventive services (vaccines, screening tests, monitoring & exams) can help personalize your care plan, which helps you manage your own care. Screening tests can find health problems at the earliest stages, when they are easiest to treat. [address] [facility] follows the current, evidence-based guidelines published by the [facility] Elvin Nico ([facility]) when recommending preventive services for our patients. Because we follow these guidelines, sometimes recommendations change over time as research supports it. (For example, mammograms used to be recommended annually. Even though Medicare will still pay for an annual mammogram, the newer guidelines recommend a mammogram every two years for women of average risk.)    Of course, you and your provider may decide to screen more often for some diseases, based on your risk and co-morbidities (chronic disease you are already diagnosed with). Preventive services for you include:    - Medicare offers their members a free annual wellness visit, which is time for you and your primary care provider to discuss and plan for your preventive service needs. Take advantage of this benefit every year!    -All people over age 72 should receive the recommended pneumonia vaccines. Current USPSTF guidelines recommend a series of two vaccines for the best pneumonia protection.     -All adults should have a yearly flu vaccine and a tetanus vaccine every 10 years. All adults age 61 years should receive a shingles vaccine once in their lifetime.      -A bone mass density test is recommended when a woman turns 65 to screen for osteoporosis.  This test is only recommended once as a screening. Some providers will use this same test as a disease monitoring tool if you already have osteoporosis. -All adults age 38-68 years who are overweight should have a diabetes screening test once every three years.     -Other screening tests & preventive services for persons with diabetes include: an eye exam to screen for diabetic retinopathy, a kidney function test, a foot exam, and stricter control over your cholesterol.     -Cardiovascular screening for adults with routine risk involves an electrocardiogram (ECG) at intervals determined by the provider.     -Colorectal cancer screenings should be done for adults age 54-65 years with normal risk. There are a number of acceptable methods of screening for this type of cancer. Each test has its own benefits and drawbacks. Discuss with your provider what is most appropriate for you during your annual wellness visit. The different tests include: colonoscopy (considered the best screening method), a fecal occult blood test, a fecal DNA test, and sigmoidoscopy. -Breast cancer screenings are recommended every other year for women of normal risk age 54-69 years.     -Cervical cancer screenings for women over age 72 are only recommended with certain risk factors.     -All adults born between [facility] should be screened once for Hepatitis C.      Here is a list of your current Health Maintenance items (your personalized list of preventive services) with a due date:  Health Maintenance Due   Topic Date Due    Annual Well Visit  07/08/2018

## 2018-07-20 NOTE — PROGRESS NOTES
This is the Subsequent Medicare Annual Wellness Exam, performed 12 months or more after the Initial AWV or the last Subsequent AWV    I have reviewed the patient's medical history in detail and updated the computerized patient record. History     Past Medical History:   Diagnosis Date    Arthritis     Diabetes ([facility] [address] 3/20/2010    HLD (hyperlipidemia) 3/20/2010    Hypertension     Osteopenia 10/30/2012    PSVT (paroxysmal supraventricular tachycardia) ([facility] [address] 3/20/2010    Psychiatric disorder     HX OF DEPRESSION-\"LONG TIME AGO\"    Psychiatric disorder     ANXIETY      Past Surgical History:   Procedure Laterality Date    BREAST SURGERY PROCEDURE UNLISTED  1975    cyst-RIGHT    CARDIAC SURG PROCEDURE UNLIST  2006    tachycardia    stress test    ENDOSCOPY, COLON, DIAGNOSTIC  2004    HX ADENOIDECTOMY      HX BREAST BIOPSY Right     neg    HX CATARACT REMOVAL  2013    HX CHOLECYSTECTOMY      HX DILATION AND CURETTAGE  1962    HX ORTHOPAEDIC  1976    L FOOT    HX TONSILLECTOMY      NEUROLOGICAL PROCEDURE UNLISTED  8/14    cervical decompression     Current Outpatient Prescriptions   Medication Sig Dispense Refill    chlorthalidone (HYGROTEN) 25 mg tablet Take 1 Tab by mouth daily. 90 Tab 1    gabapentin (NEURONTIN) 100 mg capsule Take 2 Caps by mouth two (2) times a day. 120 Cap 5    losartan (COZAAR) 100 mg tablet TAKE ONE TABLET BY MOUTH ONCE DAILY 90 Tab 1    Lancets misc Use once daily **DX:E08.9** 100 Each 5    metFORMIN (GLUCOPHAGE) 1,000 mg tablet Take 1 Tab by mouth two (2) times daily (with meals). 180 Tab 1    simvastatin (ZOCOR) 40 mg tablet Take 1 Tab by mouth nightly. TAKE ONE TABLET BY MOUTH AT NIGHT 90 Tab 3    Calcium-Cholecalciferol, D3, 600 mg(1,500mg) -400 unit cap Take 2 Tabs by mouth daily.  glucose blood VI test strips (BLOOD GLUCOSE TEST) strip Check once daily **DX:E08.9** 100 Strip 2    MULTIVITAMIN PO Take 1 Tab by mouth daily.       aspirin 81 mg chewable tablet Take 81 mg by mouth daily.  triamcinolone acetonide (KENALOG) 0.1 % topical cream Apply  to affected area two (2) times a day. use thin layer 15 g 0     No Known Allergies  Family History   Problem Relation Age of Onset    Heart Disease Father     Liver Disease Father      HEP C    Asthma Mother     Lung Disease Mother     Seizures Sister     Anesth Problems Neg Hx      Social History   Substance Use Topics    Smoking status: Former Smoker     Packs/day: 0.50     Years: 29.00     Quit date: 8/23/1989    Smokeless tobacco: Never Used    Alcohol use Yes      Comment: rarely     Patient Active Problem List   Diagnosis Code    Diabetes ([facility] [facility] E11.9    HLD (hyperlipidemia) E78.5    HTN (hypertension) I10    PSVT (paroxysmal supraventricular tachycardia) (HCC) I47.1    Osteopenia M85.80    Obesity, diabetes, and hypertension syndrome ([facility] [facility] E11.9, I10, E66.9    Status post cervical spinal fusion Z98.1    Post-operative state Z98.890    Acute medial meniscus tear of right knee S83.241A    Advanced directives, counseling/discussion Z71.89    Coronary artery disease involving native coronary artery with angina pectoris ([facility]) I25.119    Type 2 diabetes mellitus with nephropathy ([facility]) E11.21       Depression Risk Factor Screening:     PHQ over the last two weeks 3/20/2018   Little interest or pleasure in doing things Not at all   Feeling down, depressed, irritable, or hopeless Not at all   Total Score PHQ 2 0     Alcohol Risk Factor Screening: You do not drink alcohol or very rarely. Functional Ability and Level of Safety:   Hearing Loss  Hearing is good. Activities of Daily Living  The home contains: no safety equipment. Patient does total self care    Fall Risk  Fall Risk Assessment, last 12 mths 3/20/2018   Able to walk? Yes   Fall in past 12 months?  No   Fall with injury? -   Number of falls in past 12 months -       Abuse Screen  Patient is not abused    Cognitive Screening Evaluation of Cognitive Function:  Has your family/caregiver stated any concerns about your memory: no  Normal    Patient Care Team   Patient Care Team:  Julien Rojas MD as PCP - General  Nicanor Recinos MD (Ophthalmology)  Erick Maurer, RN as Ambulatory Care Navigator (Internal Medicine)    Assessment/Plan   Education and counseling provided:  Are appropriate based on today's review and evaluation    Diagnoses and all orders for this visit:    1. Medicare annual wellness visit, subsequent      Health Maintenance Due   Topic Date Due    MEDICARE YEARLY EXAM  07/08/2018     Chief Complaint   Patient presents with    Diabetes    Hypertension    Coronary Artery Disease    Osteopenia     [facility] 12/7-12/12 for NSTEMI. Presented with c/o palpitations, dizziness, low BP.       Palpitations/Dizziness: head CT, brain MRI, head/neck MRA unremarkable, most likely cardiac related     NSTEMI: troponin peaked at 1.1, initially placed on heparin drip echo with EF 55-60%, cardiac cath 12/11 with nonobstructive CAD, cont ASA/statin, no BB added per cardiology  Follows card now yearly visit       SUBJECTIVE: Milo Florentino is a [de-identified] y.o. female seen for a follow up visit; she has diabetes, hypertension, hyperlipidemia, coronary artery disease and history of prior MI. Current Outpatient Prescriptions   Medication Sig Dispense Refill    chlorthalidone (HYGROTEN) 25 mg tablet Take 1 Tab by mouth daily. 90 Tab 1    gabapentin (NEURONTIN) 100 mg capsule Take 2 Caps by mouth two (2) times a day. 120 Cap 5    losartan (COZAAR) 100 mg tablet TAKE ONE TABLET BY MOUTH ONCE DAILY 90 Tab 1    Lancets misc Use once daily **DX:E08.9** 100 Each 5    metFORMIN (GLUCOPHAGE) 1,000 mg tablet Take 1 Tab by mouth two (2) times daily (with meals). 180 Tab 1    simvastatin (ZOCOR) 40 mg tablet Take 1 Tab by mouth nightly.  TAKE ONE TABLET BY MOUTH AT NIGHT 90 Tab 3    Calcium-Cholecalciferol, D3, 600 mg(1,500mg) -400 unit cap Take 2 Tabs by mouth daily.  glucose blood VI test strips (BLOOD GLUCOSE TEST) strip Check once daily **DX:E08.9** 100 Strip 2    MULTIVITAMIN PO Take 1 Tab by mouth daily.  aspirin 81 mg chewable tablet Take 81 mg by mouth daily.  triamcinolone acetonide (KENALOG) 0.1 % topical cream Apply  to affected area two (2) times a day. use thin layer 15 g 0     Patient Active Problem List   Diagnosis Code    Diabetes ([facility] [address] E11.9    HLD (hyperlipidemia) E78.5    HTN (hypertension) I10    PSVT (paroxysmal supraventricular tachycardia) (HCC) I47.1    Osteopenia M85.80    Obesity, diabetes, and hypertension syndrome (HCC) E11.9, I10, E66.9    Status post cervical spinal fusion Z98.1    Advanced directives, counseling/discussion Z71.89    Coronary artery disease involving native coronary artery with angina pectoris ([facility] [address] I25.119    Type 2 diabetes mellitus with nephropathy ([facility] E11.21     System Review: Cardiovascular ROS - taking medications as instructed, no medication side effects noted, patient does not perform home BP monitoring, no TIA's, no chest pain on exertion, no dyspnea on exertion, no swelling of ankles, Diabetic ROS - medication compliance: compliant all of the time, diabetic diet compliance: compliant most of the time, home glucose monitoring: is performed regularly, fasting values range 110. New concerns: feels great  Orthopedic issues are resolved for now. OBJECTIVE:  Visit Vitals    /69    Pulse 76    Temp 98.7 °F (37.1 °C) (Oral)    Resp 16    Ht 5' 3\" (1.6 m)    Wt 177 lb 3.2 oz (80.4 kg)    SpO2 98%    BMI 31.39 kg/m2      Appearance: alert, well appearing, and in no distress and overweight. General exam: CVS exam BP noted to be borderline elevated today in office, S1, S2 normal, no gallop, no murmur, chest clear, no JVD, no HSM, no edema.   Lab review: labs are reviewed, up to date and normal.   Lab Results   Component Value Date/Time    Hemoglobin A1c 6.1 (H) 06/14/2018 08:49 AM    Hemoglobin A1c (POC) 6.6 04/28/2015 01:32 PM    Hemoglobin A1c, External 6.5 12/08/2017     Lab Results   Component Value Date/Time    Cholesterol, total 165 06/14/2018 08:49 AM    HDL Cholesterol 70 06/14/2018 08:49 AM    LDL, calculated 77 06/14/2018 08:49 AM    VLDL, calculated 18 06/14/2018 08:49 AM    Triglyceride 90 06/14/2018 08:49 AM    CHOL/HDL Ratio 2.4 06/16/2010 08:47 AM       ASSESSMENT:  diabetes stable, hypertension stable, hyperlipidemia stable, improved, coronary artery disease asymptomatic. PLAN:  current treatment plan is effective, no change in therapy  lab results and schedule of future lab studies reviewed with patient  repeat labs ordered prior to next appointment  reviewed diet, exercise and weight control  recommended sodium restriction. 1. Medicare annual wellness visit, subsequent  She is fine    2. Type 2 diabetes mellitus with nephropathy ([address]  Has improved    3. Coronary artery disease involving native coronary artery of native heart with angina pectoris ([address]  No symptoms  Card notes reviewed    4. Essential hypertension  Control ok better at home    5.  Pure hypercholesterolemia  Improved high dose statin

## 2018-07-21 NOTE — PROGRESS NOTES
No chief complaint on file. No chief complaint on file. Left buttock pain    This is a Subsequent Medicare Annual Wellness Visit providing Personalized Prevention Plan Services (PPPS) (Performed 12 months after initial AWV and PPPS )    I have reviewed the patient's medical history in detail and updated the computerized patient record. History     Past Medical History:   Diagnosis Date    Arthritis     Diabetes ([facility] 3/20/2010    HLD (hyperlipidemia) 3/20/2010    Hypertension     Osteopenia 10/30/2012    PSVT (paroxysmal supraventricular tachycardia) ([facility] 3/20/2010    Psychiatric disorder     HX OF DEPRESSION-\"LONG TIME AGO\"    Psychiatric disorder     ANXIETY      Past Surgical History:   Procedure Laterality Date    BREAST SURGERY PROCEDURE UNLISTED  1975    cyst-RIGHT    CARDIAC SURG PROCEDURE UNLIST  2006    tachycardia    stress test    ENDOSCOPY, COLON, DIAGNOSTIC  2004    HX ADENOIDECTOMY      HX BREAST BIOPSY Right     neg    HX CATARACT REMOVAL  2013    HX CHOLECYSTECTOMY      HX DILATION AND CURETTAGE  1962    HX ORTHOPAEDIC  1976    L FOOT    HX TONSILLECTOMY      NEUROLOGICAL PROCEDURE UNLISTED  8/14    cervical decompression     Current Outpatient Prescriptions   Medication Sig Dispense Refill    chlorthalidone (HYGROTEN) 25 mg tablet Take 1 Tab by mouth daily. 90 Tab 1    gabapentin (NEURONTIN) 100 mg capsule Take 2 Caps by mouth two (2) times a day. 120 Cap 5    triamcinolone acetonide (KENALOG) 0.1 % topical cream Apply  to affected area two (2) times a day. use thin layer 15 g 0    losartan (COZAAR) 100 mg tablet TAKE ONE TABLET BY MOUTH ONCE DAILY 90 Tab 1    Lancets misc Use once daily **DX:E08.9** 100 Each 5    metFORMIN (GLUCOPHAGE) 1,000 mg tablet Take 1 Tab by mouth two (2) times daily (with meals). 180 Tab 1    simvastatin (ZOCOR) 40 mg tablet Take 1 Tab by mouth nightly.  TAKE ONE TABLET BY MOUTH AT NIGHT 90 Tab 3    Calcium-Cholecalciferol, D3, 600 mg(1,500mg) -400 unit cap Take 2 Tabs by mouth daily.  glucose blood VI test strips (BLOOD GLUCOSE TEST) strip Check once daily **DX:E08.9** 100 Strip 2    MULTIVITAMIN PO Take 1 Tab by mouth daily.  aspirin 81 mg chewable tablet Take 81 mg by mouth daily. No Known Allergies  Family History   Problem Relation Age of Onset    Heart Disease Father     Liver Disease Father      HEP C    Asthma Mother     Lung Disease Mother     Seizures Sister     Anesth Problems Neg Hx      Social History   Substance Use Topics    Smoking status: Former Smoker     Packs/day: 0.50     Years: 29.00     Quit date: 8/23/1989    Smokeless tobacco: Never Used    Alcohol use Yes      Comment: rarely     Patient Active Problem List   Diagnosis Code    Diabetes ([facility] E11.9    HLD (hyperlipidemia) E78.5    HTN (hypertension) I10    PSVT (paroxysmal supraventricular tachycardia) (HCC) I47.1    Osteopenia M85.80    Obesity, diabetes, and hypertension syndrome (HCC) E11.9, I10, E66.9    Status post cervical spinal fusion Z98.1    Advanced directives, counseling/discussion Z71.89    Coronary artery disease involving native coronary artery with angina pectoris ([facility] I25.119    Type 2 diabetes mellitus with nephropathy ([facility] E11.21       Depression Risk Factor Screening:     PHQ over the last two weeks 3/20/2018   Little interest or pleasure in doing things Not at all   Feeling down, depressed, irritable, or hopeless Not at all   Total Score PHQ 2 0     Alcohol Risk Factor Screening: On any occasion during the past 3 months, have you had more than 4 drinks containing alcohol? No    Do you average more than 14 drinks per week? No        Functional Ability and Level of Safety:     Hearing Loss   mild    Activities of Daily Living   Self-care. Requires assistance with: no ADLs    Fall Risk   Fall Risk Assessment, last 12 mths 3/20/2018   Able to walk? Yes   Fall in past 12 months? No   Fall with injury?  - Number of falls in past 12 months -     Abuse Screen   Patient is not abused    Review of Systems   Pertinent items are noted in HPI. Physical Examination     Evaluation of Cognitive Function:  Mood/affect:  neutral  Appearance: age appropriate  Family member/caregiver input: no    There were no vitals taken for this visit. General appearance: alert, cooperative, no distress, appears stated age  Back exam: tenderness noted slight lumbar. Patient Care Team:  Isai Mendez MD as PCP - General  Sahara Rondon MD (Ophthalmology)  Fartun Hudson RN as Ambulatory Care Navigator (Internal Medicine)    Advice/Referrals/Counseling   Education and counseling provided:  Are appropriate based on today's review and evaluation  End-of-Life planning (with patient's consent)      Assessment/Plan   . Advance Care Planning (ACP) Provider Conversation Snapshot    Date of ACP Conversation: 07/20/18  Persons included in Conversation:  patient  Length of ACP Conversation in minutes:  <16 minutes (Non-Billable)    Authorized Decision Maker (if patient is incapable of making informed decisions): This person is: Other Legally Authorized Decision Maker (e.g. Next of Kin)          For Patients with Decision Making Capacity:   Values/Goals: Exploration of values, goals, and preferences if recovery is not expected, even with continued medical treatment in the event of:  Imminent death    Conversation Outcomes / Follow-Up Plan:   Recommended completion of Advance Directive form after review of ACP materials and conversation with prospective healthcare agent           SUBJECTIVE: Daniel Ledezma is a [de-identified] y.o. female seen for a follow up visit; she has diabetes, hypertension, hyperlipidemia and coronary artery disease. Current Outpatient Prescriptions   Medication Sig Dispense Refill    chlorthalidone (HYGROTEN) 25 mg tablet Take 1 Tab by mouth daily.  90 Tab 1    gabapentin (NEURONTIN) 100 mg capsule Take 2 Caps by mouth two (2) times a day. 120 Cap 5    triamcinolone acetonide (KENALOG) 0.1 % topical cream Apply  to affected area two (2) times a day. use thin layer 15 g 0    losartan (COZAAR) 100 mg tablet TAKE ONE TABLET BY MOUTH ONCE DAILY 90 Tab 1    Lancets misc Use once daily **DX:E08.9** 100 Each 5    metFORMIN (GLUCOPHAGE) 1,000 mg tablet Take 1 Tab by mouth two (2) times daily (with meals). 180 Tab 1    simvastatin (ZOCOR) 40 mg tablet Take 1 Tab by mouth nightly. TAKE ONE TABLET BY MOUTH AT NIGHT 90 Tab 3    Calcium-Cholecalciferol, D3, 600 mg(1,500mg) -400 unit cap Take 2 Tabs by mouth daily.  glucose blood VI test strips (BLOOD GLUCOSE TEST) strip Check once daily **DX:E08.9** 100 Strip 2    MULTIVITAMIN PO Take 1 Tab by mouth daily.  aspirin 81 mg chewable tablet Take 81 mg by mouth daily. Patient Active Problem List   Diagnosis Code    Diabetes ([facility] E11.9    HLD (hyperlipidemia) E78.5    HTN (hypertension) I10    PSVT (paroxysmal supraventricular tachycardia) ([facility] I47.1    Osteopenia M85.80    Obesity, diabetes, and hypertension syndrome ([address] E11.9, I10, E66.9    Status post cervical spinal fusion Z98.1    Advanced directives, counseling/discussion Z71.89    Coronary artery disease involving native coronary artery with angina pectoris ([facility] I25.119    Type 2 diabetes mellitus with nephropathy ([facility] E11.21     System Review: Cardiovascular ROS - taking medications as instructed, no medication side effects noted, no TIA's, no chest pain on exertion, no dyspnea on exertion, no swelling of ankles, no orthostatic dizziness or lightheadedness, no palpitations, following card. New concerns: stable no ortho issues now. OBJECTIVE:  There were no vitals taken for this visit. Vitals:    07/20/18 2133   BP: 136/78         Appearance: alert, well appearing, and in no distress and oriented to person, place, and time.   General exam: CVS exam BP noted to be well controlled today in office, S1, S2 normal, no gallop, no murmur, chest clear, no JVD, no HSM, no edema. Lab review: labs reviewed, I note that glycosylated hemoglobin   Lab Results   Component Value Date/Time    Hemoglobin A1c 6.1 (H) 06/14/2018 08:49 AM    Hemoglobin A1c (POC) 6.6 04/28/2015 01:32 PM    Hemoglobin A1c, External 6.5 12/08/2017     Lab Results   Component Value Date/Time    Cholesterol, total 165 06/14/2018 08:49 AM    HDL Cholesterol 70 06/14/2018 08:49 AM    LDL, calculated 77 06/14/2018 08:49 AM    VLDL, calculated 18 06/14/2018 08:49 AM    Triglyceride 90 06/14/2018 08:49 AM    CHOL/HDL Ratio 2.4 06/16/2010 08:47 AM       , lipids LDL result meets goal.     ASSESSMENT:  diabetes well controlled, stable, hypertension stable, hyperlipidemia stable, improved, coronary artery disease stable, asymptomatic. PLAN:  current treatment plan is effective, no change in therapy  lab results and schedule of future lab studies reviewed with patient  repeat labs ordered prior to next appointment  orders and follow up as documented in patient record  reviewed diet, exercise and weight control. 1. Coronary artery disease involving native coronary artery of native heart with angina pectoris ([address]  stable    2. Type 2 diabetes mellitus with nephropathy ([address]  controlled    3. Essential hypertension  controlled    4.  Pure hypercholesterolemia  On good dose

## 2018-08-07 RX ORDER — METFORMIN HYDROCHLORIDE 1000 MG/1
TABLET ORAL
Qty: 180 TAB | Refills: 1 | Status: SHIPPED | OUTPATIENT
Start: 2018-08-07 | End: 2019-02-02 | Stop reason: SDUPTHER

## 2018-08-07 RX ORDER — METFORMIN HYDROCHLORIDE 1000 MG/1
1000 TABLET ORAL 2 TIMES DAILY WITH MEALS
Qty: 180 TAB | Refills: 1 | Status: SHIPPED | OUTPATIENT
Start: 2018-08-07 | End: 2019-03-19 | Stop reason: SDUPTHER

## 2018-09-06 RX ORDER — LOSARTAN POTASSIUM 100 MG/1
TABLET ORAL
Qty: 90 TAB | Refills: 1 | Status: SHIPPED | OUTPATIENT
Start: 2018-09-06 | End: 2019-03-03 | Stop reason: SDUPTHER

## 2018-09-06 NOTE — TELEPHONE ENCOUNTER
From: Amy Hughes  To: Lynn Murray MD  Sent: 9/6/2018 12:31 PM EDT  Subject: Medication Renewal Request    Original authorizing provider: MD Aparna Inman.  Teresa Ahmadi would like a refill of the following medications:  losartan (COZAAR) 100 mg tablet Lynn Murray MD]    Preferred pharmacy: [address] [place] [address] 36.:

## 2018-10-11 RX ORDER — GABAPENTIN 100 MG/1
200 CAPSULE ORAL 2 TIMES DAILY
Qty: 120 CAP | Refills: 5 | Status: SHIPPED | OUTPATIENT
Start: 2018-10-11 | End: 2019-02-02 | Stop reason: SDUPTHER

## 2018-11-15 RX ORDER — CHLORTHALIDONE 25 MG/1
25 TABLET ORAL DAILY
Qty: 90 TAB | Refills: 1 | Status: SHIPPED | OUTPATIENT
Start: 2018-11-15 | End: 2019-05-14 | Stop reason: SDUPTHER

## 2018-12-03 DIAGNOSIS — E08.3513: Primary | ICD-10-CM

## 2018-12-13 ENCOUNTER — HOSPITAL ENCOUNTER (OUTPATIENT)
Dept: LAB | Age: 83
Discharge: HOME | End: 2018-12-13
Payer: MEDICARE

## 2018-12-13 PROCEDURE — 85025 COMPLETE CBC W/AUTO DIFF WBC: CPT

## 2018-12-13 PROCEDURE — 36415 COLL VENOUS BLD VENIPUNCTURE: CPT

## 2018-12-13 PROCEDURE — 83036 HEMOGLOBIN GLYCOSYLATED A1C: CPT

## 2018-12-13 PROCEDURE — 80053 COMPREHEN METABOLIC PANEL: CPT

## 2018-12-13 PROCEDURE — 80061 LIPID PANEL: CPT

## 2018-12-14 LAB
ALBUMIN SERPL-MCNC: 4.4 G/DL (ref 3.5–4.7)
ALBUMIN/GLOB SERPL: 2.1 {RATIO} (ref 1.2–2.2)
ALP SERPL-CCNC: 47 IU/L (ref 39–117)
ALT SERPL-CCNC: 21 IU/L (ref 0–32)
AST SERPL-CCNC: 26 IU/L (ref 0–40)
BASOPHILS # BLD AUTO: 0 X10E3/UL (ref 0–0.2)
BASOPHILS NFR BLD AUTO: 0 %
BILIRUB SERPL-MCNC: 0.4 MG/DL (ref 0–1.2)
BUN SERPL-MCNC: 34 MG/DL (ref 8–27)
BUN/CREAT SERPL: 28 (ref 12–28)
CALCIUM SERPL-MCNC: 9.9 MG/DL (ref 8.7–10.3)
CHLORIDE SERPL-SCNC: 100 MMOL/L (ref 96–106)
CHOLEST SERPL-MCNC: 167 MG/DL (ref 100–199)
CO2 SERPL-SCNC: 27 MMOL/L (ref 20–29)
CREAT SERPL-MCNC: 1.22 MG/DL (ref 0.57–1)
EOSINOPHIL # BLD AUTO: 0.5 X10E3/UL (ref 0–0.4)
EOSINOPHIL NFR BLD AUTO: 8 %
ERYTHROCYTE [DISTWIDTH] IN BLOOD BY AUTOMATED COUNT: 13.2 % (ref 12.3–15.4)
GLOBULIN SER CALC-MCNC: 2.1 G/DL (ref 1.5–4.5)
GLUCOSE SERPL-MCNC: 124 MG/DL (ref 65–99)
HBA1C MFR BLD: 6.3 % (ref 4.8–5.6)
HCT VFR BLD AUTO: 37.1 % (ref 34–46.6)
HDLC SERPL-MCNC: 62 MG/DL
HGB BLD-MCNC: 12.1 G/DL (ref 11.1–15.9)
IMM GRANULOCYTES # BLD: 0 X10E3/UL (ref 0–0.1)
IMM GRANULOCYTES NFR BLD: 0 %
INTERPRETATION, 910389: NORMAL
INTERPRETATION: NORMAL
LDLC SERPL CALC-MCNC: 79 MG/DL (ref 0–99)
LYMPHOCYTES # BLD AUTO: 1.5 X10E3/UL (ref 0.7–3.1)
LYMPHOCYTES NFR BLD AUTO: 24 %
MCH RBC QN AUTO: 31.1 PG (ref 26.6–33)
MCHC RBC AUTO-ENTMCNC: 32.6 G/DL (ref 31.5–35.7)
MCV RBC AUTO: 95 FL (ref 79–97)
MONOCYTES # BLD AUTO: 0.4 X10E3/UL (ref 0.1–0.9)
MONOCYTES NFR BLD AUTO: 7 %
NEUTROPHILS # BLD AUTO: 3.7 X10E3/UL (ref 1.4–7)
NEUTROPHILS NFR BLD AUTO: 61 %
PDF IMAGE, 910387: NORMAL
PLATELET # BLD AUTO: 253 X10E3/UL (ref 150–379)
POTASSIUM SERPL-SCNC: 5 MMOL/L (ref 3.5–5.2)
PROT SERPL-MCNC: 6.5 G/DL (ref 6–8.5)
RBC # BLD AUTO: 3.89 X10E6/UL (ref 3.77–5.28)
SODIUM SERPL-SCNC: 139 MMOL/L (ref 134–144)
TRIGL SERPL-MCNC: 129 MG/DL (ref 0–149)
VLDLC SERPL CALC-MCNC: 26 MG/DL (ref 5–40)
WBC # BLD AUTO: 6.1 X10E3/UL (ref 3.4–10.8)

## 2019-01-10 ENCOUNTER — HOSPITAL ENCOUNTER (OUTPATIENT)
Dept: MAMMOGRAPHY | Age: 84
Discharge: HOME | End: 2019-01-10
Attending: INTERNAL MEDICINE
Payer: MEDICARE

## 2019-01-10 DIAGNOSIS — Z12.31 VISIT FOR SCREENING MAMMOGRAM: ICD-10-CM

## 2019-01-10 PROCEDURE — 77067 SCR MAMMO BI INCL CAD: CPT

## 2019-02-04 RX ORDER — GABAPENTIN 100 MG/1
200 CAPSULE ORAL 2 TIMES DAILY
Qty: 120 CAP | Refills: 5 | Status: SHIPPED | OUTPATIENT
Start: 2019-02-04 | End: 2019-07-25 | Stop reason: SDUPTHER

## 2019-02-04 RX ORDER — METFORMIN HYDROCHLORIDE 1000 MG/1
1000 TABLET ORAL 2 TIMES DAILY WITH MEALS
Qty: 180 TAB | Refills: 1 | Status: SHIPPED | OUTPATIENT
Start: 2019-02-04 | End: 2019-03-19

## 2019-03-04 RX ORDER — LOSARTAN POTASSIUM 100 MG/1
TABLET ORAL
Qty: 90 TAB | Refills: 1 | Status: SHIPPED | OUTPATIENT
Start: 2019-03-04 | End: 2019-08-22

## 2019-03-19 ENCOUNTER — OFFICE VISIT (OUTPATIENT)
Dept: INTERNAL MEDICINE CLINIC | Age: 84
End: 2019-03-19

## 2019-03-19 VITALS
SYSTOLIC BLOOD PRESSURE: 145 MMHG | DIASTOLIC BLOOD PRESSURE: 44 MMHG | HEIGHT: 63 IN | OXYGEN SATURATION: 98 % | BODY MASS INDEX: 31.01 KG/M2 | WEIGHT: 175 LBS | HEART RATE: 72 BPM | RESPIRATION RATE: 18 BRPM | TEMPERATURE: 96.3 F

## 2019-03-19 DIAGNOSIS — E78.00 PURE HYPERCHOLESTEROLEMIA: ICD-10-CM

## 2019-03-19 DIAGNOSIS — E08.3513: Primary | ICD-10-CM

## 2019-03-19 DIAGNOSIS — I10 ESSENTIAL HYPERTENSION: ICD-10-CM

## 2019-03-19 RX ORDER — METFORMIN HYDROCHLORIDE 1000 MG/1
TABLET ORAL
Qty: 180 TAB | Refills: 1 | Status: SHIPPED | OUTPATIENT
Start: 2019-03-19 | End: 2019-05-22 | Stop reason: SDUPTHER

## 2019-03-19 NOTE — PROGRESS NOTES
Chief Complaint Patient presents with  Diabetes  Hypertension  Coronary Artery Disease  Osteopenia  Cholesterol Problem 2/28 podiatry [facility] visit SUBJECTIVE: Steve Lockett is a 82 y.o. female seen for a follow up visit; she has diabetes, hypertension and hyperlipidemia. Current Outpatient Medications Medication Sig Dispense Refill  metFORMIN (GLUCOPHAGE) 1,000 mg tablet 1000 mg po QAM and 500 mg po Q  Tab 1  
 losartan (COZAAR) 100 mg tablet TAKE ONE TABLET BY MOUTH ONCE DAILY 90 Tab 1  
 gabapentin (NEURONTIN) 100 mg capsule Take 2 Caps by mouth two (2) times a day. 120 Cap 5  
 simvastatin (ZOCOR) 40 mg tablet Take 1 Tab by mouth nightly. TAKE ONE TABLET BY MOUTH AT NIGHT 90 Tab 3  chlorthalidone (HYGROTEN) 25 mg tablet Take 1 Tab by mouth daily. 90 Tab 1  
 glucose blood VI test strips (BLOOD GLUCOSE TEST) strip Check once daily **DX:E08.9** 100 Strip 2  
 glucose blood VI test strips (BLOOD GLUCOSE TEST) strip Check once daily **DX:E08.9** 100 Strip 2  
 triamcinolone acetonide (KENALOG) 0.1 % topical cream Apply  to affected area two (2) times a day. use thin layer 15 g 0  
 Lancets misc Use once daily **DX:E08.9** 100 Each 5  
 Calcium-Cholecalciferol, D3, 600 mg(1,500mg) -400 unit cap Take 2 Tabs by mouth daily.  MULTIVITAMIN PO Take 1 Tab by mouth daily.  aspirin 81 mg chewable tablet Take 81 mg by mouth daily. Patient Active Problem List  
Diagnosis Code  Diabetes ([facility] E11.9  
 HLD (hyperlipidemia) E78.5  
 HTN (hypertension) I10  
 PSVT (paroxysmal supraventricular tachycardia) ([facility]) I47.1  Osteopenia M85.80  Obesity, diabetes, and hypertension syndrome (HCC) E11.9, I10, E66.9  Status post cervical spinal fusion Z98.1  Advanced directives, counseling/discussion Z71.89  Coronary artery disease involving native coronary artery with angina pectoris ([facility] I25.119  
  Type 2 diabetes mellitus with nephropathy (HCC) E11.21 System Review: Cardiovascular ROS - taking medications as instructed, no medication side effects noted, no TIA's, no chest pain on exertion, no dyspnea on exertion, no swelling of ankles, Diabetic ROS - medication compliance: compliant all of the time, diabetic diet compliance: compliant most of the time, home glucose monitoring: is performed regularly, values are usually normal, CNS ROS - no TIA or stroke-like symptoms, no amaurosis, diplopia, abnormal speech, unilateral numbness or weakness. New concerns: feet. OBJECTIVE: 
Visit Vitals /44 (BP 1 Location: Right arm, BP Patient Position: Sitting) Pulse 72 Temp 96.3 °F (35.7 °C) (Oral) Resp 18 Ht 5' 3\" (1.6 m) Wt 175 lb (79.4 kg) SpO2 98% BMI 31.00 kg/m² Appearance: alert, well appearing, and in no distress, oriented to person, place, and time and overweight. General exam: CVS exam BP noted to be well controlled today in office, S1, S2 normal, no gallop, no murmur, chest clear, no JVD, no HSM, no edema. Diabetic foot exam:  
 
Left Foot: 
 Visual Exam: normal  
 Pulse DP: 2+ (normal) Filament test: normal sensation Vibratory sensation: normal 
   
Right Foot: 
 Visual Exam: normal   Hammer toe deformity  3 toes significant Pulse DP: 2+ (normal) Filament test: normal sensation Vibratory sensation: normal 
 
 
Lab review: labs are reviewed, up to date and normal.  
 
ASSESSMENT: 
diabetes stable, hypertension stable, hyperlipidemia stable. PLAN: 
lab results and schedule of future lab studies reviewed with patient 
repeat labs ordered prior to next appointment 
orders and follow up as documented in patient record. Diagnoses and all orders for this visit: 1.  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 
-      DIABETES FOOT EXAM 
 
 2. Essential hypertension 3. Pure hypercholesterolemia Other orders 
-     metFORMIN (GLUCOPHAGE) 1,000 mg tablet; 1000 mg po QAM and 500 mg po Q PM 
 
The patient may benefit from diabetic shoes due to foot deformity Will lower dose metformin as  Has mild renal insufficiency  And some diarrhea

## 2019-04-21 RX ORDER — LANCETS
EACH MISCELLANEOUS
Qty: 50 EACH | Refills: 5 | Status: SHIPPED | OUTPATIENT
Start: 2019-04-21 | End: 2019-04-26 | Stop reason: SDUPTHER

## 2019-04-24 NOTE — TELEPHONE ENCOUNTER
Patient says in order for insurance to cover diabetic supplies it has to be noted that she is a diabetic.

## 2019-04-26 RX ORDER — LANCETS
EACH MISCELLANEOUS
Qty: 50 EACH | Refills: 5 | Status: CANCELLED | OUTPATIENT
Start: 2019-04-26

## 2019-05-14 RX ORDER — CHLORTHALIDONE 25 MG/1
25 TABLET ORAL DAILY
Qty: 90 TAB | Refills: 1 | Status: SHIPPED | OUTPATIENT
Start: 2019-05-14 | End: 2019-09-11 | Stop reason: SDUPTHER

## 2019-05-14 RX ORDER — METFORMIN HYDROCHLORIDE 1000 MG/1
TABLET ORAL
Qty: 180 TAB | Refills: 1 | Status: CANCELLED | OUTPATIENT
Start: 2019-05-14

## 2019-05-21 ENCOUNTER — OFFICE VISIT (OUTPATIENT)
Dept: INTERNAL MEDICINE CLINIC | Age: 84
End: 2019-05-21

## 2019-05-21 VITALS
HEIGHT: 63 IN | TEMPERATURE: 96.6 F | WEIGHT: 178 LBS | BODY MASS INDEX: 31.54 KG/M2 | DIASTOLIC BLOOD PRESSURE: 72 MMHG | RESPIRATION RATE: 18 BRPM | HEART RATE: 70 BPM | OXYGEN SATURATION: 98 % | SYSTOLIC BLOOD PRESSURE: 158 MMHG

## 2019-05-21 DIAGNOSIS — E08.3513: ICD-10-CM

## 2019-05-21 DIAGNOSIS — L30.4 INTERTRIGO: Primary | ICD-10-CM

## 2019-05-21 RX ORDER — NYSTATIN 100000 [USP'U]/G
POWDER TOPICAL 3 TIMES DAILY
Qty: 60 G | Refills: 4 | Status: SHIPPED | OUTPATIENT
Start: 2019-05-21 | End: 2019-09-11 | Stop reason: SDUPTHER

## 2019-05-21 NOTE — PROGRESS NOTES
1. Have you been to the ER, urgent care clinic since your last visit? Hospitalized since your last visit? No    2. Have you seen or consulted any other health care providers outside of the [address] [place] [address] since your last visit? Include any pap smears or colon screening. Yes.   Dr Cross-cardiology

## 2019-05-21 NOTE — PROGRESS NOTES
Subjective:     Chief Complaint   Patient presents with    Rash     groin bilat and in crease   moving toward buttock not itching     She is a 82y.o. year old female who presents for evaluation. Objective:     Vitals:    05/21/19 0844 05/21/19 0914   BP: 177/53 158/72   Pulse: 70    Resp: 18    Temp: 96.6 °F (35.9 °C)    TempSrc: Oral    SpO2: 98%    Weight: 178 lb (80.7 kg)    Height: 5' 3\" (1.6 m)        Physical Examination: General appearance - alert, well appearing, and in no distress  Neck - supple, no significant adenopathy  Chest -   Heart -   Abdomen -   Extremities - peripheral pulses normal, no pedal edema, no clubbing or cyanosis  Has intertriginous rash left folds mostly      Assessment/ Plan:       ICD-10-CM ICD-9-CM    1. 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 Intertrigo    2. 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       Try nystatin powder

## 2019-06-04 RX ORDER — BLOOD-GLUCOSE METER
EACH MISCELLANEOUS
Qty: 1 EACH | Refills: 0 | Status: SHIPPED | OUTPATIENT
Start: 2019-06-04

## 2019-06-04 NOTE — TELEPHONE ENCOUNTER
PTs battery  on her One Touch so she has replaced the batteries however, pt stated she does not know how to or what numbers to put back in as they're no longer saved on machine. Pt does not remember where she received device as it has been so long since she picked up. PT would like a call back with assistance.        Pt stated please leave a message if you miss her as she will be leaving soon to go to the [address] [place] [address],[address]

## 2019-06-11 ENCOUNTER — HOSPITAL ENCOUNTER (OUTPATIENT)
Dept: LAB | Age: 84
Discharge: HOME | End: 2019-06-11
Payer: MEDICARE

## 2019-06-11 PROCEDURE — 36415 COLL VENOUS BLD VENIPUNCTURE: CPT

## 2019-06-11 PROCEDURE — 80061 LIPID PANEL: CPT

## 2019-06-11 PROCEDURE — 80053 COMPREHEN METABOLIC PANEL: CPT

## 2019-06-11 PROCEDURE — 82043 UR ALBUMIN QUANTITATIVE: CPT

## 2019-06-11 PROCEDURE — 83036 HEMOGLOBIN GLYCOSYLATED A1C: CPT

## 2019-06-11 PROCEDURE — 85025 COMPLETE CBC W/AUTO DIFF WBC: CPT

## 2019-06-12 LAB
ALBUMIN SERPL-MCNC: 4.5 G/DL (ref 3.5–4.7)
ALBUMIN/CREAT UR: 6.1 MG/G CREAT (ref 0–30)
ALBUMIN/GLOB SERPL: 2 {RATIO} (ref 1.2–2.2)
ALP SERPL-CCNC: 48 IU/L (ref 39–117)
ALT SERPL-CCNC: 17 IU/L (ref 0–32)
AST SERPL-CCNC: 24 IU/L (ref 0–40)
BASOPHILS # BLD AUTO: 0 X10E3/UL (ref 0–0.2)
BASOPHILS NFR BLD AUTO: 1 %
BILIRUB SERPL-MCNC: 0.5 MG/DL (ref 0–1.2)
BUN SERPL-MCNC: 28 MG/DL (ref 8–27)
BUN/CREAT SERPL: 24 (ref 12–28)
CALCIUM SERPL-MCNC: 10.7 MG/DL (ref 8.7–10.3)
CHLORIDE SERPL-SCNC: 99 MMOL/L (ref 96–106)
CHOLEST SERPL-MCNC: 164 MG/DL (ref 100–199)
CO2 SERPL-SCNC: 26 MMOL/L (ref 20–29)
CREAT SERPL-MCNC: 1.18 MG/DL (ref 0.57–1)
CREAT UR-MCNC: 57.3 MG/DL
EOSINOPHIL # BLD AUTO: 0.6 X10E3/UL (ref 0–0.4)
EOSINOPHIL NFR BLD AUTO: 9 %
ERYTHROCYTE [DISTWIDTH] IN BLOOD BY AUTOMATED COUNT: 12.7 % (ref 12.3–15.4)
GLOBULIN SER CALC-MCNC: 2.2 G/DL (ref 1.5–4.5)
GLUCOSE SERPL-MCNC: 149 MG/DL (ref 65–99)
HBA1C MFR BLD: 6.6 % (ref 4.8–5.6)
HCT VFR BLD AUTO: 37.5 % (ref 34–46.6)
HDLC SERPL-MCNC: 64 MG/DL
HGB BLD-MCNC: 12.4 G/DL (ref 11.1–15.9)
IMM GRANULOCYTES # BLD AUTO: 0 X10E3/UL (ref 0–0.1)
IMM GRANULOCYTES NFR BLD AUTO: 0 %
INTERPRETATION, 910389: NORMAL
INTERPRETATION: NORMAL
LDLC SERPL CALC-MCNC: 76 MG/DL (ref 0–99)
LYMPHOCYTES # BLD AUTO: 1.5 X10E3/UL (ref 0.7–3.1)
LYMPHOCYTES NFR BLD AUTO: 24 %
Lab: NORMAL
MCH RBC QN AUTO: 31.2 PG (ref 26.6–33)
MCHC RBC AUTO-ENTMCNC: 33.1 G/DL (ref 31.5–35.7)
MCV RBC AUTO: 94 FL (ref 79–97)
MICROALBUMIN UR-MCNC: 3.5 UG/ML
MONOCYTES # BLD AUTO: 0.4 X10E3/UL (ref 0.1–0.9)
MONOCYTES NFR BLD AUTO: 7 %
NEUTROPHILS # BLD AUTO: 3.7 X10E3/UL (ref 1.4–7)
NEUTROPHILS NFR BLD AUTO: 59 %
PDF IMAGE, 910387: NORMAL
PLATELET # BLD AUTO: 250 X10E3/UL (ref 150–450)
POTASSIUM SERPL-SCNC: 4.9 MMOL/L (ref 3.5–5.2)
PROT SERPL-MCNC: 6.7 G/DL (ref 6–8.5)
RBC # BLD AUTO: 3.98 X10E6/UL (ref 3.77–5.28)
SODIUM SERPL-SCNC: 141 MMOL/L (ref 134–144)
TRIGL SERPL-MCNC: 118 MG/DL (ref 0–149)
VLDLC SERPL CALC-MCNC: 24 MG/DL (ref 5–40)
WBC # BLD AUTO: 6.3 X10E3/UL (ref 3.4–10.8)

## 2019-07-25 DIAGNOSIS — E11.21 TYPE 2 DIABETES MELLITUS WITH NEPHROPATHY (HCC): Primary | ICD-10-CM

## 2019-07-25 RX ORDER — GABAPENTIN 100 MG/1
200 CAPSULE ORAL 2 TIMES DAILY
Qty: 120 CAP | Refills: 5 | Status: SHIPPED | OUTPATIENT
Start: 2019-07-25 | End: 2019-09-11 | Stop reason: SDUPTHER

## 2019-08-22 ENCOUNTER — OFFICE VISIT (OUTPATIENT)
Dept: INTERNAL MEDICINE CLINIC | Age: 84
End: 2019-08-22

## 2019-08-22 VITALS
SYSTOLIC BLOOD PRESSURE: 153 MMHG | HEART RATE: 80 BPM | OXYGEN SATURATION: 98 % | WEIGHT: 174 LBS | TEMPERATURE: 97 F | RESPIRATION RATE: 18 BRPM | HEIGHT: 63 IN | BODY MASS INDEX: 30.83 KG/M2 | DIASTOLIC BLOOD PRESSURE: 70 MMHG

## 2019-08-22 DIAGNOSIS — I25.119 CORONARY ARTERY DISEASE INVOLVING NATIVE CORONARY ARTERY OF NATIVE HEART WITH ANGINA PECTORIS (HCC): ICD-10-CM

## 2019-08-22 DIAGNOSIS — I10 ESSENTIAL HYPERTENSION: ICD-10-CM

## 2019-08-22 DIAGNOSIS — E11.21 TYPE 2 DIABETES MELLITUS WITH NEPHROPATHY (HCC): ICD-10-CM

## 2019-08-22 DIAGNOSIS — Z00.00 MEDICARE ANNUAL WELLNESS VISIT, SUBSEQUENT: Primary | ICD-10-CM

## 2019-08-22 DIAGNOSIS — Z78.0 MENOPAUSE: ICD-10-CM

## 2019-08-22 DIAGNOSIS — N18.30 CKD (CHRONIC KIDNEY DISEASE) STAGE 3, GFR 30-59 ML/MIN (HCC): ICD-10-CM

## 2019-08-22 DIAGNOSIS — E78.00 PURE HYPERCHOLESTEROLEMIA: ICD-10-CM

## 2019-08-22 RX ORDER — VALSARTAN 320 MG/1
320 TABLET ORAL DAILY
Qty: 90 TAB | Refills: 1 | Status: SHIPPED | OUTPATIENT
Start: 2019-08-22 | End: 2019-09-11 | Stop reason: SDUPTHER

## 2019-08-22 RX ORDER — KETOCONAZOLE 20 MG/G
CREAM TOPICAL DAILY
Qty: 30 G | Refills: 2 | Status: SHIPPED | OUTPATIENT
Start: 2019-08-22 | End: 2019-09-11 | Stop reason: SDUPTHER

## 2019-08-22 NOTE — PATIENT INSTRUCTIONS
Medicare Wellness Visit, Female     The best way to live healthy is to have a lifestyle where you eat a well-balanced diet, exercise regularly, limit alcohol use, and quit all forms of tobacco/nicotine, if applicable. Regular preventive services are another way to keep healthy. Preventive services (vaccines, screening tests, monitoring & exams) can help personalize your care plan, which helps you manage your own care. Screening tests can find health problems at the earliest stages, when they are easiest to treat. [facility] follows the current, evidence-based guidelines published by the [facility]vin Nico ([facility]) when recommending preventive services for our patients. Because we follow these guidelines, sometimes recommendations change over time as research supports it. (For example, mammograms used to be recommended annually. Even though Medicare will still pay for an annual mammogram, the newer guidelines recommend a mammogram every two years for women of average risk.)  Of course, you and your doctor may decide to screen more often for some diseases, based on your risk and your health status. Preventive services for you include:  - Medicare offers their members a free annual wellness visit, which is time for you and your primary care provider to discuss and plan for your preventive service needs. Take advantage of this benefit every year!  -All adults over the age of 72 should receive the recommended pneumonia vaccines. Current USPSTF guidelines recommend a series of two vaccines for the best pneumonia protection.   -All adults should have a flu vaccine yearly and a tetanus vaccine every 10 years. All adults age 61 and older should receive a shingles vaccine once in their lifetime.    -A bone mass density test is recommended when a woman turns 65 to screen for osteoporosis. This test is only recommended one time, as a screening.  Some providers will use this same test as a disease monitoring tool if you already have osteoporosis. -All adults age 38-68 who are overweight should have a diabetes screening test once every three years.   -Other screening tests and preventive services for persons with diabetes include: an eye exam to screen for diabetic retinopathy, a kidney function test, a foot exam, and stricter control over your cholesterol.   -Cardiovascular screening for adults with routine risk involves an electrocardiogram (ECG) at intervals determined by your doctor.   -Colorectal cancer screenings should be done for adults age 54-65 with no increased risk factors for colorectal cancer. There are a number of acceptable methods of screening for this type of cancer. Each test has its own benefits and drawbacks. Discuss with your doctor what is most appropriate for you during your annual wellness visit. The different tests include: colonoscopy (considered the best screening method), a fecal occult blood test, a fecal DNA test, and sigmoidoscopy. -Breast cancer screenings are recommended every other year for women of normal risk, age 54-69.  -Cervical cancer screenings for women over age 72 are only recommended with certain risk factors.   -All adults born between [facility] should be screened once for Hepatitis C.      Here is a list of your current Health Maintenance items (your personalized list of preventive services) with a due date:  Health Maintenance Due   Topic Date Due    Annual Well Visit  07/21/2019    Flu Vaccine  08/01/2019

## 2019-08-22 NOTE — PROGRESS NOTES
Chief Complaint   Patient presents with   [facility] Annual Wellness Visit     medicare wellness     2/28 podiatry [facility] visit      SUBJECTIVE: Charles Ross is a 82 y.o. female seen for a follow up visit; she has diabetes, hypertension and hyperlipidemia. Current Outpatient Medications   Medication Sig Dispense Refill    gabapentin (NEURONTIN) 100 mg capsule Take 2 Caps by mouth two (2) times a day. 120 Cap 5    Blood-Glucose Meter (PHARMACIST CHOICE GLUCOSE SYS) misc Test blood sugar once daily \"E11.9\" 1 Each 0    glucose blood VI test strips (PHARMACIST CHOICE) strip Test blood sugar once daily \"E11.9\" 50 Strip 11    metFORMIN (GLUCOPHAGE) 1,000 mg tablet 1000 mg po QAM and 500 mg po Q  Tab 1    nystatin (MYCOSTATIN) powder Apply  to affected area three (3) times daily. 60 g 4    chlorthalidone (HYGROTEN) 25 mg tablet Take 1 Tab by mouth daily. 90 Tab 1    lancets (ONETOUCH ULTRASOFT LANCETS) misc USE   TO CHECK GLUCOSE ONCE DAILY \"E11.9\" 100 Each 3    losartan (COZAAR) 100 mg tablet TAKE ONE TABLET BY MOUTH ONCE DAILY 90 Tab 1    simvastatin (ZOCOR) 40 mg tablet Take 1 Tab by mouth nightly. TAKE ONE TABLET BY MOUTH AT NIGHT 90 Tab 3    glucose blood VI test strips (BLOOD GLUCOSE TEST) strip Check once daily **DX:E08.9** 100 Strip 2    triamcinolone acetonide (KENALOG) 0.1 % topical cream Apply  to affected area two (2) times a day. use thin layer 15 g 0    Calcium-Cholecalciferol, D3, 600 mg(1,500mg) -400 unit cap Take 2 Tabs by mouth daily.  MULTIVITAMIN PO Take 1 Tab by mouth daily.  aspirin 81 mg chewable tablet Take 81 mg by mouth daily.        Patient Active Problem List   Diagnosis Code    Diabetes ([address] E11.9    HLD (hyperlipidemia) E78.5    HTN (hypertension) I10    PSVT (paroxysmal supraventricular tachycardia) (HCC) I47.1    Osteopenia M85.80    Obesity, diabetes, and hypertension syndrome ([address] E11.9, I10, E66.9    Status post cervical spinal fusion Z98.1    Advanced directives, counseling/discussion Z71.89    Coronary artery disease involving native coronary artery with angina pectoris ([facility] I25.119    Type 2 diabetes mellitus with nephropathy ([facility] E11.21     System Review: Cardiovascular ROS - taking medications as instructed, no medication side effects noted, no TIA's, no chest pain on exertion, no dyspnea on exertion, no swelling of ankles, Diabetic ROS - medication compliance: compliant all of the time, diabetic diet compliance: compliant most of the time, home glucose monitoring: is performed regularly, values are usually normal, CNS ROS - no TIA or stroke-like symptoms, no amaurosis, diplopia, abnormal speech, unilateral numbness or weakness. New concerns: lost her partner last month may go back [place] with family later this year   Home BP better  OBJECTIVE:  Visit Vitals  /69 (BP 1 Location: Left arm, BP Patient Position: Sitting)   Pulse 80   Temp 97 °F (36.1 °C) (Oral)   Resp 18   Ht 5' 3\" (1.6 m)   Wt 174 lb (78.9 kg)   SpO2 98%   BMI 30.82 kg/m²      Appearance: alert, well appearing, and in no distress, oriented to person, place, and time and overweight. General exam: CVS exam BP noted to be well controlled today in office, S1, S2 normal, no gallop, no murmur, chest clear, no JVD, no HSM, no edema. Diabetic foot exam:     Left Foot:   Visual Exam: normal    Pulse DP: 2+ (normal)   Filament test: normal sensation    Vibratory sensation: normal      Right Foot:   Visual Exam: normal   Hammer toe deformity  3 toes significant   Pulse DP: 2+ (normal)   Filament test: normal sensation    Vibratory sensation: normal      Lab review: labs are reviewed, up to date and normal.     ASSESSMENT:  diabetes stable, hypertension stable, hyperlipidemia stable. PLAN:  lab results and schedule of future lab studies reviewed with patient  repeat labs ordered prior to next appointment  orders and follow up as documented in patient record. The patient may benefit from diabetic shoes due to foot deformity     Will lower dose metformin as  Has mild renal insufficiency  And some diarrhea  This is the Subsequent Medicare Annual Wellness Exam, performed 12 months or more after the Initial AWV or the last Subsequent AWV    I have reviewed the patient's medical history in detail and updated the computerized patient record. History     Past Medical History:   Diagnosis Date    Arthritis     Diabetes ([facility] 3/20/2010    HLD (hyperlipidemia) 3/20/2010    Hypertension     Osteopenia 10/30/2012    PSVT (paroxysmal supraventricular tachycardia) ([facility] 3/20/2010    Psychiatric disorder     HX OF DEPRESSION-\"LONG TIME AGO\"    Psychiatric disorder     ANXIETY      Past Surgical History:   Procedure Laterality Date    BREAST SURGERY PROCEDURE UNLISTED  1975    cyst-RIGHT    CARDIAC SURG PROCEDURE UNLIST  2006    tachycardia    stress test    ENDOSCOPY, COLON, DIAGNOSTIC  2004    HX ADENOIDECTOMY      HX BREAST BIOPSY Right     neg    HX CATARACT REMOVAL  2013    HX CHOLECYSTECTOMY      HX DILATION AND CURETTAGE  1962    HX ORTHOPAEDIC  1976    L FOOT    HX TONSILLECTOMY      NEUROLOGICAL PROCEDURE UNLISTED  8/14    cervical decompression     Current Outpatient Medications   Medication Sig Dispense Refill    gabapentin (NEURONTIN) 100 mg capsule Take 2 Caps by mouth two (2) times a day. 120 Cap 5    Blood-Glucose Meter (PHARMACIST CHOICE GLUCOSE SYS) misc Test blood sugar once daily \"E11.9\" 1 Each 0    glucose blood VI test strips (PHARMACIST CHOICE) strip Test blood sugar once daily \"E11.9\" 50 Strip 11    metFORMIN (GLUCOPHAGE) 1,000 mg tablet 1000 mg po QAM and 500 mg po Q  Tab 1    nystatin (MYCOSTATIN) powder Apply  to affected area three (3) times daily. 60 g 4    chlorthalidone (HYGROTEN) 25 mg tablet Take 1 Tab by mouth daily.  90 Tab 1    lancets (ONETOUCH ULTRASOFT LANCETS) misc USE   TO CHECK GLUCOSE ONCE DAILY \"E11.9\" 100 Each 3    losartan (COZAAR) 100 mg tablet TAKE ONE TABLET BY MOUTH ONCE DAILY 90 Tab 1    simvastatin (ZOCOR) 40 mg tablet Take 1 Tab by mouth nightly. TAKE ONE TABLET BY MOUTH AT NIGHT 90 Tab 3    glucose blood VI test strips (BLOOD GLUCOSE TEST) strip Check once daily **DX:E08.9** 100 Strip 2    triamcinolone acetonide (KENALOG) 0.1 % topical cream Apply  to affected area two (2) times a day. use thin layer 15 g 0    Calcium-Cholecalciferol, D3, 600 mg(1,500mg) -400 unit cap Take 2 Tabs by mouth daily.  MULTIVITAMIN PO Take 1 Tab by mouth daily.  aspirin 81 mg chewable tablet Take 81 mg by mouth daily.        No Known Allergies  Family History   Problem Relation Age of Onset    Heart Disease Father     Liver Disease Father         HEP C    Asthma Mother     Lung Disease Mother     Seizures Sister     Anesth Problems Neg Hx      Social History     Tobacco Use    Smoking status: Former Smoker     Packs/day: 0.50     Years: 29.00     Pack years: 14.50     Last attempt to quit: 1989     Years since quittin.0    Smokeless tobacco: Never Used   Substance Use Topics    Alcohol use: Yes     Comment: rarely     Patient Active Problem List   Diagnosis Code    Diabetes ([facility] E11.9    HLD (hyperlipidemia) E78.5    HTN (hypertension) I10    PSVT (paroxysmal supraventricular tachycardia) (HCC) I47.1    Osteopenia M85.80    Obesity, diabetes, and hypertension syndrome ([address] E11.9, I10, E66.9    Status post cervical spinal fusion Z98.1    Advanced directives, counseling/discussion Z71.89    Coronary artery disease involving native coronary artery with angina pectoris ([facility] I25.119    Type 2 diabetes mellitus with nephropathy ([facility] E11.21       Depression Risk Factor Screening:     3 most recent PHQ Screens 2019   Little interest or pleasure in doing things Several days   Feeling down, depressed, irritable, or hopeless Several days   Total Score PHQ 2 2     Alcohol Risk Factor Screening:       Functional Ability and Level of Safety:   Hearing Loss  Hearing is good. Activities of Daily Living  The home contains: no safety equipment. Patient does total self care    Fall Risk  Fall Risk Assessment, last 12 mths 3/19/2019   Able to walk? Yes   Fall in past 12 months? No   Fall with injury? -   Number of falls in past 12 months -       Abuse Screen  Patient is not abused    Cognitive Screening   Evaluation of Cognitive Function:  Has your family/caregiver stated any concerns about your memory: no  Normal    Patient Care Team   Patient Care Team:  Salinas Hendrix MD as PCP - Nela Gregg MD (Ophthalmology)    Assessment/Plan   Education and counseling provided:  Are appropriate based on today's review and evaluation    Diagnoses and all orders for this visit:    1. Medicare annual wellness visit, subsequent        Health Maintenance Due   Topic Date Due    MEDICARE YEARLY EXAM  07/21/2019    Influenza Age 5 to Adult  08/01/2019     Advance Care Planning (ACP) Provider Dorcas Piedra Snapshot    Date of ACP Conversation: 08/22/19  Persons included in Conversation:  patient  Length of ACP Conversation in minutes:  <16 minutes (Non-Billable)    Authorized Decision Maker (if patient is incapable of making informed decisions): This person is: Other Legally Authorized Decision Maker (e.g. Next of Kin)            For Patients with Decision Making Capacity:   Values/Goals: Exploration of values, goals, and preferences if recovery is not expected, even with continued medical treatment in the event of:  Severe, permanent brain injury    Conversation Outcomes / Follow-Up Plan:   will need redo acp as things have cjanged    Vitals:    08/22/19 0914 08/22/19 0945   BP: 174/69 153/70   Pulse: 80    Resp: 18    Temp: 97 °F (36.1 °C)    TempSrc: Oral    SpO2: 98%    Weight: 174 lb (78.9 kg)    Height: 5' 3\" (1.6 m)    S1 and S2 normal, no murmurs, clicks, gallops or rubs. Regular rate and rhythm.  Chest is clear; no wheezes or rales. No edema or JVD. Lab Results   Component Value Date/Time    WBC 6.3 06/11/2019 08:35 AM    HGB 12.4 06/11/2019 08:35 AM    Hemoglobin (POC) 13.1 02/11/2016 11:37 AM    HCT 37.5 06/11/2019 08:35 AM    PLATELET 940 15/92/9070 08:35 AM    MCV 94 06/11/2019 08:35 AM     Lab Results   Component Value Date/Time    Hemoglobin A1c 6.6 (H) 06/11/2019 08:35 AM    Hemoglobin A1c 6.3 (H) 12/13/2018 08:37 AM    Hemoglobin A1c 6.1 (H) 06/14/2018 08:49 AM    Hemoglobin A1c, External 6.5 12/08/2017    Glucose 149 (H) 06/11/2019 08:35 AM    Glucose (POC) 111 (H) 02/11/2016 01:45 PM    Microalbumin/Creat ratio (mg/g creat) 12 12/28/2009 09:15 AM    Microalb/Creat ratio (ug/mg creat.) 6.1 06/11/2019 08:35 AM    Microalbumin,urine random 0.74 12/28/2009 09:15 AM    LDL, calculated 76 06/11/2019 08:35 AM    Creatinine 1.18 (H) 06/11/2019 08:35 AM      Lab Results   Component Value Date/Time    Cholesterol, total 164 06/11/2019 08:35 AM    HDL Cholesterol 64 06/11/2019 08:35 AM    LDL, calculated 76 06/11/2019 08:35 AM    LDL-C, External 42 12/08/2017    Triglyceride 118 06/11/2019 08:35 AM    CHOL/HDL Ratio 2.4 06/16/2010 08:47 AM     Lab Results   Component Value Date/Time    ALT (SGPT) 17 06/11/2019 08:35 AM    AST (SGOT) 24 06/11/2019 08:35 AM    Alk. phosphatase 48 06/11/2019 08:35 AM    Bilirubin, total 0.5 06/11/2019 08:35 AM    Albumin 4.5 06/11/2019 08:35 AM    Protein, total 6.7 06/11/2019 08:35 AM    INR, External 1.1 12/08/2017    Prothrombin time, External 12.2 12/08/2017    PLATELET 026 93/08/6010 08:35 AM     Lab Results   Component Value Date/Time    GFR est non-AA 43 (L) 06/11/2019 08:35 AM    GFR est AA 50 (L) 06/11/2019 08:35 AM    Creatinine 1.18 (H) 06/11/2019 08:35 AM    BUN 28 (H) 06/11/2019 08:35 AM    Sodium 141 06/11/2019 08:35 AM    Potassium 4.9 06/11/2019 08:35 AM    Chloride 99 06/11/2019 08:35 AM    CO2 26 06/11/2019 08:35 AM    Magnesium 1.7 12/20/2011 08:53 AM     1.  Medicare annual wellness visit, subsequent  reviewed    2. Menopause  due  - DEXA BONE DENSITY STUDY AXIAL; Future    3. CKD (chronic kidney disease) stage 3, GFR 30-59 ml/min ([facility])  Lab Results   Component Value Date/Time    GFR est AA 50 (L) 06/11/2019 08:35 AM    GFR est non-AA 43 (L) 06/11/2019 08:35 AM    Creatinine 1.18 (H) 06/11/2019 08:35 AM    BUN 28 (H) 06/11/2019 08:35 AM    Sodium 141 06/11/2019 08:35 AM    Potassium 4.9 06/11/2019 08:35 AM    Chloride 99 06/11/2019 08:35 AM    CO2 26 06/11/2019 08:35 AM         4. Pure hypercholesterolemia  stable    5. Essential hypertension  Not controlled change to diovan 320    6. Type 2 diabetes mellitus with nephropathy ([facility])  controlled    7.  Coronary artery disease involving native coronary artery of native heart with angina pectoris ([facility]  No issues

## 2019-08-22 NOTE — PROGRESS NOTES
1. Have you been to the ER, urgent care clinic since your last visit? Hospitalized since your last visit? No    2. Have you seen or consulted any other health care providers outside of the [address] [place] [address] since your last visit? Include any pap smears or colon screening.  No

## 2019-09-11 ENCOUNTER — HOSPITAL ENCOUNTER (OUTPATIENT)
Dept: MAMMOGRAPHY | Age: 84
Discharge: HOME | End: 2019-09-11
Attending: INTERNAL MEDICINE
Payer: MEDICARE

## 2019-09-11 DIAGNOSIS — E11.21 TYPE 2 DIABETES MELLITUS WITH NEPHROPATHY (HCC): ICD-10-CM

## 2019-09-11 DIAGNOSIS — Z78.0 MENOPAUSE: ICD-10-CM

## 2019-09-11 DIAGNOSIS — L25.9 CONTACT DERMATITIS, UNSPECIFIED CONTACT DERMATITIS TYPE, UNSPECIFIED TRIGGER: ICD-10-CM

## 2019-09-11 PROCEDURE — 77080 DXA BONE DENSITY AXIAL: CPT

## 2019-09-11 RX ORDER — TRIAMCINOLONE ACETONIDE 1 MG/G
CREAM TOPICAL 2 TIMES DAILY
Qty: 15 G | Refills: 0 | Status: SHIPPED | OUTPATIENT
Start: 2019-09-11

## 2019-09-11 RX ORDER — VALSARTAN 320 MG/1
320 TABLET ORAL DAILY
Qty: 90 TAB | Refills: 1 | Status: SHIPPED | OUTPATIENT
Start: 2019-09-11

## 2019-09-11 RX ORDER — CHLORTHALIDONE 25 MG/1
25 TABLET ORAL DAILY
Qty: 90 TAB | Refills: 1 | Status: SHIPPED | OUTPATIENT
Start: 2019-09-11

## 2019-09-11 RX ORDER — METFORMIN HYDROCHLORIDE 1000 MG/1
TABLET ORAL
Qty: 135 TAB | Refills: 1 | Status: SHIPPED | OUTPATIENT
Start: 2019-09-11

## 2019-09-11 RX ORDER — CHROMIUM PICOLINATE 200 MCG
2 TABLET ORAL DAILY
Qty: 180 CAP | Refills: 1 | Status: SHIPPED | OUTPATIENT
Start: 2019-09-11

## 2019-09-11 RX ORDER — SIMVASTATIN 40 MG/1
40 TABLET, FILM COATED ORAL
Qty: 90 TAB | Refills: 1 | Status: SHIPPED | OUTPATIENT
Start: 2019-09-11

## 2019-09-11 RX ORDER — GUAIFENESIN 100 MG/5ML
81 LIQUID (ML) ORAL DAILY
Qty: 90 TAB | Refills: 1 | Status: SHIPPED | OUTPATIENT
Start: 2019-09-11

## 2019-09-11 RX ORDER — KETOCONAZOLE 20 MG/G
CREAM TOPICAL DAILY
Qty: 30 G | Refills: 2 | Status: SHIPPED | OUTPATIENT
Start: 2019-09-11

## 2019-09-11 RX ORDER — GABAPENTIN 100 MG/1
200 CAPSULE ORAL 2 TIMES DAILY
Qty: 360 CAP | Refills: 1 | Status: SHIPPED | OUTPATIENT
Start: 2019-09-11

## 2019-09-11 RX ORDER — NYSTATIN 100000 [USP'U]/G
POWDER TOPICAL 3 TIMES DAILY
Qty: 60 G | Refills: 4 | Status: SHIPPED | OUTPATIENT
Start: 2019-09-11

## 2019-09-11 NOTE — TELEPHONE ENCOUNTER
Patient is moving out of [place] at the end of October.  She is requesting paper scripts of all her medications to take with her until she finds a new MD.